# Patient Record
Sex: FEMALE | Race: BLACK OR AFRICAN AMERICAN | NOT HISPANIC OR LATINO | Employment: UNEMPLOYED | URBAN - METROPOLITAN AREA
[De-identification: names, ages, dates, MRNs, and addresses within clinical notes are randomized per-mention and may not be internally consistent; named-entity substitution may affect disease eponyms.]

---

## 2014-07-16 LAB
LEFT EYE DM RETINOPATHY: NEGATIVE
RIGHT EYE DM RETINOPATHY: NEGATIVE

## 2015-11-19 LAB
LEFT EYE DM RETINOPATHY: NEGATIVE
RIGHT EYE DM RETINOPATHY: NEGATIVE

## 2017-01-19 LAB
LEFT EYE DM RETINOPATHY: NEGATIVE
RIGHT EYE DM RETINOPATHY: NEGATIVE

## 2020-02-06 ENCOUNTER — HISTORICAL (OUTPATIENT)
Dept: LAB | Facility: HOSPITAL | Age: 35
End: 2020-02-06

## 2020-02-06 LAB
ABS NEUT (OLG): 3.54 X10(3)/MCL (ref 2.1–9.2)
ALBUMIN SERPL-MCNC: 3.9 GM/DL (ref 3.4–5)
ALBUMIN/GLOB SERPL: 1 RATIO (ref 1.1–2)
ALP SERPL-CCNC: 96 UNIT/L (ref 38–126)
ALT SERPL-CCNC: 66 UNIT/L (ref 12–78)
AST SERPL-CCNC: 59 UNIT/L (ref 15–37)
BASOPHILS # BLD AUTO: 0 X10(3)/MCL (ref 0–0.2)
BASOPHILS NFR BLD AUTO: 0 %
BILIRUB SERPL-MCNC: 0.8 MG/DL (ref 0.2–1)
BILIRUBIN DIRECT+TOT PNL SERPL-MCNC: 0.2 MG/DL (ref 0–0.5)
BILIRUBIN DIRECT+TOT PNL SERPL-MCNC: 0.6 MG/DL (ref 0–0.8)
BUN SERPL-MCNC: 9 MG/DL (ref 7–18)
CALCIUM SERPL-MCNC: 8.9 MG/DL (ref 8.5–10.1)
CHLORIDE SERPL-SCNC: 101 MMOL/L (ref 98–107)
CHOLEST SERPL-MCNC: 197 MG/DL (ref 0–200)
CHOLEST/HDLC SERPL: 5.6 {RATIO} (ref 0–4)
CO2 SERPL-SCNC: 20 MMOL/L (ref 21–32)
CREAT SERPL-MCNC: 0.84 MG/DL (ref 0.55–1.02)
DEPRECATED CALCIDIOL+CALCIFEROL SERPL-MC: 17.49 NG/ML (ref 30–80)
EOSINOPHIL # BLD AUTO: 0.1 X10(3)/MCL (ref 0–0.9)
EOSINOPHIL NFR BLD AUTO: 1 %
ERYTHROCYTE [DISTWIDTH] IN BLOOD BY AUTOMATED COUNT: 12.4 % (ref 11.5–17)
EST. AVERAGE GLUCOSE BLD GHB EST-MCNC: 280 MG/DL
GLOBULIN SER-MCNC: 4.1 GM/DL (ref 2.4–3.5)
GLUCOSE SERPL-MCNC: 357 MG/DL (ref 74–106)
HBA1C MFR BLD: 11.4 % (ref 4.2–6.3)
HCT VFR BLD AUTO: 41.7 % (ref 37–47)
HDLC SERPL-MCNC: 35 MG/DL (ref 35–60)
HGB BLD-MCNC: 13.4 GM/DL (ref 12–16)
LDLC SERPL CALC-MCNC: 103 MG/DL (ref 0–129)
LYMPHOCYTES # BLD AUTO: 4.8 X10(3)/MCL (ref 0.6–4.6)
LYMPHOCYTES NFR BLD AUTO: 53 %
MCH RBC QN AUTO: 30.4 PG (ref 27–31)
MCHC RBC AUTO-ENTMCNC: 32.1 GM/DL (ref 33–36)
MCV RBC AUTO: 94.6 FL (ref 80–94)
MONOCYTES # BLD AUTO: 0.6 X10(3)/MCL (ref 0.1–1.3)
MONOCYTES NFR BLD AUTO: 6 %
NEUTROPHILS # BLD AUTO: 3.54 X10(3)/MCL (ref 2.1–9.2)
NEUTROPHILS NFR BLD AUTO: 39 %
PLATELET # BLD AUTO: 374 X10(3)/MCL (ref 130–400)
PMV BLD AUTO: 10.1 FL (ref 9.4–12.4)
POTASSIUM SERPL-SCNC: 3.9 MMOL/L (ref 3.5–5.1)
PROT SERPL-MCNC: 8 GM/DL (ref 6.4–8.2)
RBC # BLD AUTO: 4.41 X10(6)/MCL (ref 4.2–5.4)
SODIUM SERPL-SCNC: 134 MMOL/L (ref 136–145)
T4 FREE SERPL-MCNC: 1.53 NG/DL (ref 0.76–1.46)
TRIGL SERPL-MCNC: 294 MG/DL (ref 30–150)
TSH SERPL-ACNC: 5.14 MIU/L (ref 0.36–3.74)
VLDLC SERPL CALC-MCNC: 59 MG/DL
WBC # SPEC AUTO: 9.1 X10(3)/MCL (ref 4.5–11.5)

## 2020-02-26 LAB
LEFT EYE DM RETINOPATHY: NEGATIVE
RIGHT EYE DM RETINOPATHY: NEGATIVE

## 2020-03-05 ENCOUNTER — HISTORICAL (OUTPATIENT)
Dept: LAB | Facility: HOSPITAL | Age: 35
End: 2020-03-05

## 2020-03-05 LAB
CREAT UR-MCNC: 135 MG/DL
MICROALBUMIN UR-MCNC: 3.1 MG/DL
MICROALBUMIN/CREAT RATIO PNL UR: 23 MG/GM CR (ref 0–30)

## 2020-07-13 ENCOUNTER — HISTORICAL (OUTPATIENT)
Dept: LAB | Facility: HOSPITAL | Age: 35
End: 2020-07-13

## 2020-07-13 LAB
BUN SERPL-MCNC: 8.8 MG/DL (ref 7–18.7)
CALCIUM SERPL-MCNC: 8.9 MG/DL (ref 8.4–10.2)
CHLORIDE SERPL-SCNC: 99 MMOL/L (ref 98–107)
CO2 SERPL-SCNC: 26 MMOL/L (ref 22–29)
CREAT SERPL-MCNC: 0.78 MG/DL (ref 0.57–1.11)
CREAT/UREA NIT SERPL: 11
DEPRECATED CALCIDIOL+CALCIFEROL SERPL-MC: 36.2 NG/ML (ref 6.6–49.9)
EST. AVERAGE GLUCOSE BLD GHB EST-MCNC: 228.8 MG/DL
GLUCOSE SERPL-MCNC: 302 MG/DL (ref 74–100)
HBA1C MFR BLD: 9.6 %
POTASSIUM SERPL-SCNC: 4.1 MMOL/L (ref 3.5–5.1)
SODIUM SERPL-SCNC: 136 MMOL/L (ref 136–145)
T3FREE SERPL-MCNC: 3.03 PG/ML (ref 1.71–3.71)
T4 FREE SERPL-MCNC: 0.97 NG/DL (ref 0.7–1.48)
TSH SERPL-ACNC: 4.17 UIU/ML (ref 0.35–4.94)

## 2020-10-27 ENCOUNTER — HISTORICAL (OUTPATIENT)
Dept: ADMINISTRATIVE | Facility: HOSPITAL | Age: 35
End: 2020-10-27

## 2020-10-27 LAB
BUN SERPL-MCNC: 10.5 MG/DL (ref 7–18.7)
CALCIUM SERPL-MCNC: 8.5 MG/DL (ref 8.4–10.2)
CHLORIDE SERPL-SCNC: 105 MMOL/L (ref 98–107)
CHOLEST SERPL-MCNC: 118 MG/DL
CHOLEST/HDLC SERPL: 3 {RATIO} (ref 0–5)
CO2 SERPL-SCNC: 20 MMOL/L (ref 22–29)
CREAT SERPL-MCNC: 0.78 MG/DL (ref 0.55–1.02)
CREAT/UREA NIT SERPL: 13
EST. AVERAGE GLUCOSE BLD GHB EST-MCNC: 177.2 MG/DL
GLUCOSE SERPL-MCNC: 104 MG/DL (ref 74–100)
HBA1C MFR BLD: 7.8 %
HDLC SERPL-MCNC: 35 MG/DL (ref 35–60)
LDLC SERPL CALC-MCNC: 60 MG/DL (ref 50–140)
POTASSIUM SERPL-SCNC: 4.1 MMOL/L (ref 3.5–5.1)
SODIUM SERPL-SCNC: 140 MMOL/L (ref 136–145)
TRIGL SERPL-MCNC: 117 MG/DL (ref 37–140)
VLDLC SERPL CALC-MCNC: 23 MG/DL

## 2021-06-07 LAB
LEFT EYE DM RETINOPATHY: NEGATIVE
LEFT EYE DM RETINOPATHY: NEGATIVE
RIGHT EYE DM RETINOPATHY: NEGATIVE
RIGHT EYE DM RETINOPATHY: NEGATIVE

## 2021-07-15 ENCOUNTER — HISTORICAL (OUTPATIENT)
Dept: RADIOLOGY | Facility: HOSPITAL | Age: 36
End: 2021-07-15

## 2021-07-15 LAB
ALBUMIN SERPL-MCNC: 3.9 GM/DL (ref 3.5–5)
ALBUMIN/GLOB SERPL: 0.9 RATIO (ref 1.1–2)
ALP SERPL-CCNC: 86 UNIT/L (ref 40–150)
ALT SERPL-CCNC: 39 UNIT/L (ref 0–55)
AST SERPL-CCNC: 32 UNIT/L (ref 5–34)
BILIRUB SERPL-MCNC: 0.9 MG/DL (ref 0.2–1.2)
BILIRUBIN DIRECT+TOT PNL SERPL-MCNC: 0.3 MG/DL (ref 0–0.5)
BILIRUBIN DIRECT+TOT PNL SERPL-MCNC: 0.6 MG/DL (ref 0–0.8)
BUN SERPL-MCNC: 8.7 MG/DL (ref 7–18.7)
CALCIUM SERPL-MCNC: 9.9 MG/DL (ref 8.4–10.2)
CHLORIDE SERPL-SCNC: 100 MMOL/L (ref 98–107)
CO2 SERPL-SCNC: 24 MMOL/L (ref 22–29)
CREAT SERPL-MCNC: 0.89 MG/DL (ref 0.57–1.11)
CREAT UR-MCNC: 268.2 MG/DL (ref 45–106)
DEPRECATED CALCIDIOL+CALCIFEROL SERPL-MC: 58.6 NG/ML (ref 30–80)
EST. AVERAGE GLUCOSE BLD GHB EST-MCNC: 292 MG/DL
GLOBULIN SER-MCNC: 4.4 GM/DL (ref 2.4–3.5)
GLUCOSE SERPL-MCNC: 342 MG/DL (ref 74–100)
HBA1C MFR BLD: 11.8 %
MICROALBUMIN UR-MCNC: 123.2 UG/ML
MICROALBUMIN/CREAT RATIO PNL UR: 45.9 MG/GM CR (ref 0–30)
POTASSIUM SERPL-SCNC: 3.4 MMOL/L (ref 3.5–5.1)
PROT SERPL-MCNC: 8.3 GM/DL (ref 6.4–8.3)
SODIUM SERPL-SCNC: 137 MMOL/L (ref 136–145)
TSH SERPL-ACNC: 3.59 UIU/ML (ref 0.35–4.94)

## 2021-10-21 ENCOUNTER — HISTORICAL (OUTPATIENT)
Dept: LAB | Facility: HOSPITAL | Age: 36
End: 2021-10-21

## 2021-10-21 LAB
BUN SERPL-MCNC: 15.2 MG/DL (ref 7–18.7)
CALCIUM SERPL-MCNC: 9.2 MG/DL (ref 8.4–10.2)
CHLORIDE SERPL-SCNC: 100 MMOL/L (ref 98–107)
CO2 SERPL-SCNC: 24 MMOL/L (ref 22–29)
CREAT SERPL-MCNC: 0.83 MG/DL (ref 0.57–1.11)
CREAT/UREA NIT SERPL: 18
EST. AVERAGE GLUCOSE BLD GHB EST-MCNC: 246 MG/DL
GLUCOSE SERPL-MCNC: 381 MG/DL (ref 74–100)
HBA1C MFR BLD: 10.2 %
POTASSIUM SERPL-SCNC: 3.8 MMOL/L (ref 3.5–5.1)
SODIUM SERPL-SCNC: 135 MMOL/L (ref 136–145)

## 2022-01-26 ENCOUNTER — HISTORICAL (OUTPATIENT)
Dept: LAB | Facility: HOSPITAL | Age: 37
End: 2022-01-26

## 2022-01-26 LAB
EST. AVERAGE GLUCOSE BLD GHB EST-MCNC: 165.7 MG/DL
HBA1C MFR BLD: 7.4 %

## 2022-04-10 ENCOUNTER — HISTORICAL (OUTPATIENT)
Dept: ADMINISTRATIVE | Facility: HOSPITAL | Age: 37
End: 2022-04-10
Payer: MEDICARE

## 2022-04-26 VITALS
OXYGEN SATURATION: 99 % | HEIGHT: 63 IN | DIASTOLIC BLOOD PRESSURE: 62 MMHG | BODY MASS INDEX: 51.91 KG/M2 | WEIGHT: 293 LBS | SYSTOLIC BLOOD PRESSURE: 110 MMHG

## 2022-05-09 RX ORDER — GLIMEPIRIDE 4 MG/1
4 TABLET ORAL DAILY
COMMUNITY
Start: 2021-07-28 | End: 2022-05-09 | Stop reason: SDUPTHER

## 2022-05-09 RX ORDER — GLIMEPIRIDE 4 MG/1
4 TABLET ORAL DAILY
Qty: 30 TABLET | Refills: 1 | Status: SHIPPED | OUTPATIENT
Start: 2022-05-09 | End: 2022-09-09 | Stop reason: SDUPTHER

## 2022-05-27 RX ORDER — INSULIN ASPART 100 [IU]/ML
33 INJECTION, SUSPENSION SUBCUTANEOUS
COMMUNITY
Start: 2021-07-12 | End: 2022-05-27 | Stop reason: SDUPTHER

## 2022-05-31 RX ORDER — INSULIN ASPART 100 [IU]/ML
33 INJECTION, SUSPENSION SUBCUTANEOUS
Qty: 19.8 ML | Refills: 2 | Status: SHIPPED | OUTPATIENT
Start: 2022-05-31 | End: 2022-11-02

## 2022-06-06 DIAGNOSIS — E11.65 TYPE 2 DIABETES MELLITUS WITH HYPERGLYCEMIA, WITH LONG-TERM CURRENT USE OF INSULIN: Primary | ICD-10-CM

## 2022-06-06 DIAGNOSIS — Z79.4 TYPE 2 DIABETES MELLITUS WITH HYPERGLYCEMIA, WITH LONG-TERM CURRENT USE OF INSULIN: Primary | ICD-10-CM

## 2022-07-05 ENCOUNTER — LAB VISIT (OUTPATIENT)
Dept: LAB | Facility: HOSPITAL | Age: 37
End: 2022-07-05
Attending: STUDENT IN AN ORGANIZED HEALTH CARE EDUCATION/TRAINING PROGRAM
Payer: MEDICARE

## 2022-07-05 ENCOUNTER — OFFICE VISIT (OUTPATIENT)
Dept: FAMILY MEDICINE | Facility: CLINIC | Age: 37
End: 2022-07-05
Payer: MEDICARE

## 2022-07-05 ENCOUNTER — CLINICAL SUPPORT (OUTPATIENT)
Dept: DIABETES | Facility: CLINIC | Age: 37
End: 2022-07-05
Payer: MEDICARE

## 2022-07-05 VITALS
RESPIRATION RATE: 20 BRPM | DIASTOLIC BLOOD PRESSURE: 76 MMHG | BODY MASS INDEX: 59.07 KG/M2 | HEART RATE: 85 BPM | HEIGHT: 59 IN | TEMPERATURE: 98 F | OXYGEN SATURATION: 99 % | WEIGHT: 293 LBS | SYSTOLIC BLOOD PRESSURE: 109 MMHG

## 2022-07-05 VITALS — BODY MASS INDEX: 59.07 KG/M2 | HEIGHT: 59 IN | WEIGHT: 293 LBS

## 2022-07-05 DIAGNOSIS — Z79.4 INSULIN DEPENDENT TYPE 2 DIABETES MELLITUS: Primary | ICD-10-CM

## 2022-07-05 DIAGNOSIS — D50.9 IRON DEFICIENCY ANEMIA, UNSPECIFIED IRON DEFICIENCY ANEMIA TYPE: ICD-10-CM

## 2022-07-05 DIAGNOSIS — Z79.4 TYPE 2 DIABETES MELLITUS TREATED WITH INSULIN: Primary | ICD-10-CM

## 2022-07-05 DIAGNOSIS — E78.2 MIXED HYPERLIPIDEMIA: ICD-10-CM

## 2022-07-05 DIAGNOSIS — E11.9 TYPE 2 DIABETES MELLITUS TREATED WITH INSULIN: Primary | ICD-10-CM

## 2022-07-05 DIAGNOSIS — E03.9 HYPOTHYROIDISM, UNSPECIFIED TYPE: ICD-10-CM

## 2022-07-05 DIAGNOSIS — G47.33 OBSTRUCTIVE SLEEP APNEA SYNDROME: ICD-10-CM

## 2022-07-05 DIAGNOSIS — E11.65 TYPE 2 DIABETES MELLITUS WITH HYPERGLYCEMIA, WITH LONG-TERM CURRENT USE OF INSULIN: ICD-10-CM

## 2022-07-05 DIAGNOSIS — E55.9 VITAMIN D DEFICIENCY: ICD-10-CM

## 2022-07-05 DIAGNOSIS — Z79.4 TYPE 2 DIABETES MELLITUS WITH HYPERGLYCEMIA, WITH LONG-TERM CURRENT USE OF INSULIN: ICD-10-CM

## 2022-07-05 DIAGNOSIS — I10 PRIMARY HYPERTENSION: ICD-10-CM

## 2022-07-05 DIAGNOSIS — J30.9 ALLERGIC RHINITIS, UNSPECIFIED SEASONALITY, UNSPECIFIED TRIGGER: ICD-10-CM

## 2022-07-05 DIAGNOSIS — F20.9 SCHIZOPHRENIA, UNSPECIFIED TYPE: ICD-10-CM

## 2022-07-05 DIAGNOSIS — F41.1 GENERALIZED ANXIETY DISORDER: ICD-10-CM

## 2022-07-05 DIAGNOSIS — G47.00 INSOMNIA, UNSPECIFIED TYPE: ICD-10-CM

## 2022-07-05 DIAGNOSIS — E11.9 INSULIN DEPENDENT TYPE 2 DIABETES MELLITUS: Primary | ICD-10-CM

## 2022-07-05 PROBLEM — E78.5 HYPERLIPIDEMIA: Status: ACTIVE | Noted: 2022-07-05

## 2022-07-05 LAB
EST. AVERAGE GLUCOSE BLD GHB EST-MCNC: 243.2 MG/DL
HBA1C MFR BLD: 10.1 %

## 2022-07-05 PROCEDURE — 99214 OFFICE O/P EST MOD 30 MIN: CPT | Mod: ,,, | Performed by: STUDENT IN AN ORGANIZED HEALTH CARE EDUCATION/TRAINING PROGRAM

## 2022-07-05 PROCEDURE — 36415 COLL VENOUS BLD VENIPUNCTURE: CPT

## 2022-07-05 PROCEDURE — G0108 DIAB MANAGE TRN  PER INDIV: HCPCS | Mod: ,,, | Performed by: INTERNAL MEDICINE

## 2022-07-05 PROCEDURE — 99214 PR OFFICE/OUTPT VISIT, EST, LEVL IV, 30-39 MIN: ICD-10-PCS | Mod: ,,, | Performed by: STUDENT IN AN ORGANIZED HEALTH CARE EDUCATION/TRAINING PROGRAM

## 2022-07-05 PROCEDURE — G0108 PR DIAB MANAGE TRN  PER INDIV: ICD-10-PCS | Mod: ,,, | Performed by: INTERNAL MEDICINE

## 2022-07-05 PROCEDURE — 83036 HEMOGLOBIN GLYCOSYLATED A1C: CPT

## 2022-07-05 RX ORDER — INSULIN GLARGINE 100 [IU]/ML
16 INJECTION, SOLUTION SUBCUTANEOUS NIGHTLY
COMMUNITY
Start: 2022-05-04 | End: 2024-01-05 | Stop reason: SDUPTHER

## 2022-07-05 RX ORDER — BENZTROPINE MESYLATE 1 MG/1
1 TABLET ORAL 2 TIMES DAILY
COMMUNITY

## 2022-07-05 RX ORDER — METOPROLOL TARTRATE 50 MG/1
1 TABLET ORAL 2 TIMES DAILY
COMMUNITY
Start: 2021-07-28 | End: 2024-01-02 | Stop reason: SDUPTHER

## 2022-07-05 RX ORDER — LOSARTAN POTASSIUM 50 MG/1
1 TABLET ORAL DAILY
COMMUNITY
Start: 2021-07-28 | End: 2024-01-02 | Stop reason: SDUPTHER

## 2022-07-05 RX ORDER — VENLAFAXINE HYDROCHLORIDE 150 MG/1
1 CAPSULE, EXTENDED RELEASE ORAL DAILY
COMMUNITY
Start: 2022-03-12

## 2022-07-05 RX ORDER — PEN NEEDLE, DIABETIC 31 GX5/16"
NEEDLE, DISPOSABLE MISCELLANEOUS
COMMUNITY
Start: 2022-03-12 | End: 2022-08-15 | Stop reason: SDUPTHER

## 2022-07-05 RX ORDER — SITAGLIPTIN AND METFORMIN HYDROCHLORIDE 1000; 50 MG/1; MG/1
1 TABLET, FILM COATED, EXTENDED RELEASE ORAL DAILY
COMMUNITY
Start: 2022-03-12 | End: 2022-08-15 | Stop reason: SDUPTHER

## 2022-07-05 RX ORDER — ATORVASTATIN CALCIUM 10 MG/1
1 TABLET, FILM COATED ORAL DAILY
COMMUNITY
Start: 2022-04-29 | End: 2022-11-02

## 2022-07-05 RX ORDER — ARIPIPRAZOLE 30 MG/1
1 TABLET ORAL DAILY
COMMUNITY
Start: 2022-03-19

## 2022-07-05 NOTE — ASSESSMENT & PLAN NOTE
- Patient states she used to be on thyroid medication, but this was discontinued due to stable thyroid numbers.  - TSH negative from 07/15/21.

## 2022-07-05 NOTE — PROGRESS NOTES
Subjective:      Patient ID: Kimi Bell is a 36 y.o.  female. Patient is accompanied by her father, Mr. Murtaza Bell.      Chief Complaint: Chronic Medical Management    IDDMII:     Lab Results   Component Value Date    HGBA1C 10.1 (H) 07/05/2022     Patient states she checks her blood sugars BID. Patient following with [facility] for her eye exams and has an upcoming appointment in September. Patient taking Novolog 33U BID, Janumet XR nightly, Amaryl daily, and Lantus daily. She is following with Diabetes Education and has an appointment with them after this appointment.     HTN/HLD: /76. Patient taking Losartan, Metoprolol, and Lipitor.    Hypothyroidism: Patient states she used to be on thyroid medication, but this was discontinued due to stable thyroid numbers. TSH negative from 07/15/21.    Schizophrenia/Anxiety/Insomnia: Patient following with Psychiatry. Patient taking Abilify, Benztropine, and Effexor.    THEODORE: Patient following with Neurology. She states compliance with CPAP nightly.    Iron Deficiency Anemia: Patient states compliance with iron supplementation.    Vitamin D Deficiency: Vitamin D level negative from 07/15/21.    Allergic Rhinitis: Patient not taking anything consistently.    Preventative Health: Tdap given on 02/05/17.     Review of Systems   Constitutional: Negative for activity change, fatigue and fever.   Respiratory: Negative for apnea, cough and shortness of breath.    Cardiovascular: Negative for chest pain and leg swelling.   Gastrointestinal: Negative for abdominal pain, blood in stool, diarrhea, nausea and vomiting.   Genitourinary: Negative for dysuria and hematuria.   Musculoskeletal: Positive for arthralgias. Negative for back pain and myalgias.   Skin: Negative for rash and wound.   Allergic/Immunologic: Positive for environmental allergies.   Neurological: Negative for dizziness, weakness, numbness and headaches.   Psychiatric/Behavioral:  "Negative for behavioral problems.     Objective:   /76 (BP Location: Right arm)   Pulse 85   Temp 98.3 °F (36.8 °C)   Resp 20   Ht 4' 11" (1.499 m)   Wt (!) 139.2 kg (306 lb 12.8 oz)   LMP 07/05/2022   SpO2 99%   BMI 61.97 kg/m²     Physical Exam  Vitals and nursing note reviewed.   Constitutional:       General: She is not in acute distress.     Appearance: Normal appearance. She is not ill-appearing or toxic-appearing.   HENT:      Head: Normocephalic and atraumatic.      Mouth/Throat:      Mouth: Mucous membranes are moist.      Pharynx: Oropharynx is clear.   Cardiovascular:      Rate and Rhythm: Normal rate and regular rhythm.   Pulmonary:      Effort: Pulmonary effort is normal. No respiratory distress.      Breath sounds: Normal breath sounds.   Abdominal:      General: There is no distension.      Palpations: Abdomen is soft.      Tenderness: There is no abdominal tenderness.   Musculoskeletal:         General: Normal range of motion.      Right lower leg: No edema.      Left lower leg: No edema.   Skin:     General: Skin is warm and dry.      Findings: No lesion or rash.   Neurological:      General: No focal deficit present.      Mental Status: She is alert. Mental status is at baseline.   Psychiatric:         Mood and Affect: Mood normal.         Behavior: Behavior normal.         Thought Content: Thought content normal.         Judgment: Judgment normal.       Assessment:     1. Type 2 diabetes mellitus treated with insulin    2. Primary hypertension    3. Mixed hyperlipidemia    4. Hypothyroidism, unspecified type    5. Schizophrenia, unspecified type    6. Generalized anxiety disorder    7. Insomnia, unspecified type    8. Obstructive sleep apnea syndrome    9. Iron deficiency anemia, unspecified iron deficiency anemia type    10. Allergic rhinitis, unspecified seasonality, unspecified trigger    11. Vitamin D deficiency      Plan:     Problem List Items Addressed This Visit        " Psychiatric    Generalized anxiety disorder     - Refer to Schizophrenia plan.           Schizophrenia     - Patient following with Psychiatry.  - Abilify 30mg daily, Benztropine 1mg BID, and Effexor XR 140mg daily per Psychiatry.                ENT    Allergic rhinitis     - Patient recommended to try Flonase and anti-histamines OTC.              Cardiac/Vascular    Hyperlipidemia     - Continue Lipitor 10mg daily.           Hypertension     - /76, well-controlled.  - Continue Losartan 50mg daily and Metoprolol 50mg BID.              Oncology    Iron deficiency anemia     - Continue iron supplementation.                Endocrine    Hypothyroidism     - Patient states she used to be on thyroid medication, but this was discontinued due to stable thyroid numbers.  - TSH negative from 07/15/21.             Relevant Orders    TSH    T4, Free    Type 2 diabetes mellitus treated with insulin - Primary     - A1c 10.1, deteriorated.  - Increasing Lantus 6U nightly to 10U nightly.  - Increasing Novolog 33U BID to 35U BID.  - Continue Janumet XR 50mg-1000mg (2 tablets) nightly and Amaryl 4mg daily.  - Patient following with Diabetes Education.  - Patient following with [facility] for eye exams. She reports upcoming appointment in September.           Relevant Medications    LANTUS SOLOSTAR U-100 INSULIN glargine 100 units/mL SubQ pen    JANUMET XR 50-1,000 mg TM24    Vitamin D deficiency     - Vitamin D level negative from 07/15/21.             Relevant Orders    Vitamin D       Other    Insomnia     - Refer to Schizophrenia plan.           Obstructive sleep apnea syndrome     - Patient following with Neurology.  - Patient states compliance with CPAP nightly.

## 2022-07-05 NOTE — PROGRESS NOTES
Diabetes Care Specialist Progress Note  Author: Faustina Ram RN  Date: 7/5/2022    Program Intake  Reason for Diabetes Program Visit:: Intervention  Type of Intervention:: Individual  Individual: Education  Education: Nutrition and Meal Planning  Current diabetes risk level:: high  Permission to speak with others about care:: yes    Lab Results   Component Value Date    HGBA1C 10.1 (H) 07/05/2022       Clinical    Patient Health Rating  Compared to other people your age, how would you rate your health?: Fair    Problem Review  Reviewed Problem List with Patient: yes  Active comorbidities affecting diabetes self-care.: no  Reviewed health maintenance: yes    Clinical Assessment  Current Diabetes Treatment: Insulin, Oral Medication (Lantus 6units daily, Novolog 35units bid, glimepiride 4mgdaily, Janumet xr 50-1000mg 2 tabs QD)  Have you ever experienced hypoglycemia (low blood sugar)?: yes  In the last month, how often have you experienced low blood sugar?: other (see comments) (once had 89 and felt weak and shaky)  Are you able to tell when your blood sugar is low?: Yes  What symptoms do you experience?: Shaky  Have you ever been hospitalized because your blood sugar was too low?: no  How do you treat hypoglycemia (low blood sugar)?: 1/2 can soda/fruit juice  Have you ever experienced hyperglycemia (high blood sugar)?: yes  In the last month, how often have you experienced high blood sugar?: once a day  Are you able to tell when your blood sugar is high?: Yes  What are your symptoms?: fatigue    Medication Information  How do you obtain your medications?: Family picks up  How many days a week do you miss your medications?: Never  Do you use a pill box or medication chart to help you manage your medications?: Pill box  Do you sometimes have difficulty refilling your medications?: No  Medication adherence impacting ability to self-manage diabetes?: No    Labs  Do you have regular lab work to monitor your  medications?: Yes  Type of Regular Lab Work: A1c  Lab Compliance Barriers: No    Nutritional Status  Diet: Regular  Meal Plan 24 Hour Recall: Snack, Lunch, Dinner  Meal Plan 24 Hour Recall - Breakfast: skips, sleeps late  Meal Plan 24 Hour Recall - Lunch: turkey wing rice  Meal Plan 24 Hour Recall - Dinner: fried fish and french fries  Meal Plan 24 Hour Recall - Snack: bag of chocolate covered donuts  Change in appetite?: No  Dentation:: Intact  Recent Changes in Weight: No Recent Weight Change  Current nutritional status an area of need that is impacting patient's ability to self-manage diabetes?: Yes    Additional Social History    Support  Does anyone support you with your diabetes care?: yes  Who supports you?: parent, self (Father)  Who takes you to your medical appointments?: parent (Father)  Does the current support meet the patient's needs?: Yes  Is Support an area impacting ability to self-manage diabetes?: No    Access to Mass Media & Technology  Does the patient have access to any of the following devices or technologies?: Smart phone, Internet Access  Media or technology needs impacting ability to self-manage diabetes?: No    Cognitive/Behavioral Health  Alert and Oriented: Yes  Difficulty Thinking: No  Requires Prompting: No  Requires assistance for routine expression?: No  Cognitive or behavioral barriers impacting ability to self-manage diabetes?: No    Culture/Restoration  Culture or Advent beliefs that may impact ability to access healthcare: No    Communication  Language preference: English  Hearing Problems: No  Vision Problems: No  Communication needs impacting ability to self-manage diabetes?: No    Health Literacy  Preferred Learning Method: Face to Face  How often do you need to have someone help you read instructions, pamphlets, or written material from your doctor or pharmacy?: Rarely  Health literacy needs impacting ability to self-manage diabetes?: No      Diabetes Self-Management Skills  Assessment    Diabetes Disease Process/Treatment Options  Patient/caregiver able to state what happens when someone has diabetes.: yes  Patient/caregiver knows what type of diabetes they have.: yes  Diabetes Type : Type II  Patient/caregiver able to identify at least three signs and symptoms of diabetes.: yes  Identified signs and symptoms:: fatigue, increased thirst  Patient able to identify at least three risk factors for diabetes.: yes  Identified risk factors:: being overweight, family history  Diabetes Disease Process/Treatment Options: Skills Assessment Completed: Yes  Assessment indicates:: Adequate understanding  Deferred due to:: Other (comment)  Area of need?: No    Nutrition/Healthy Eating  Challenges to healthy eating:: portion control, lack of will power  Method of carbohydrate measurement:: no method  Patient can identify foods that impact blood sugar.: yes  Patient-identified foods:: sweets, starches (bread, pasta, rice, cereal)  Nutrition/Healthy Eating Skills Assessment Completed:: Yes  Assessment indicates:: Instruction Needed  Area of need?: Yes    Physical Activity/Exercise  Patient's daily activity level:: sedentary  Patient formally exercises outside of work.: no  Reasons for not exercising:: other (see comments) (Motivation and limited mobility)  Patient can identify forms of physical activity.: yes  Stated forms of physical activity:: housework, seated/chair exercises  Patient can identify reasons why exercise/physical activity is important in diabetes management.: yes  Identified reasons:: lowers blood glucose, blood pressure, and cholesterol  Physical Activity/Exercise Skills Assessment Completed: : Yes  Assessment indicates:: Adequate understanding  Area of need?: Yes    Medications  Patient is able to describe current diabetes management routine.: yes  Diabetes management routine:: oral medications, insulin  Patient is able to identify current diabetes medications, dosages, and  appropriate timing of medications.: yes  Patient understands the purpose of the medications taken for diabetes.: yes  Patient reports problems or concerns with current medication regimen.: no  Medication Skills Assessment Completed:: Yes  Assessment indicates:: Adequate understanding  Area of need?: No    Home Blood Glucose Monitoring  Patient states that blood sugar is checked at home daily.: yes  Monitoring Method:: home glucometer  Home glucometer meter type:: One Touch Verio  How often do you check your blood sugar?: Twice a day  When do you check your blood sugar?: Before lunch, Before dinner  When you check what is your typical blood sugar range? : see log  Blood glucose logs:: yes, encouraged to bring logs to provider visits, encouraged to keep logs  Blood glucose logs reviewed today?: yes  Home Blood Glucose Monitoring Skills Assessment Completed: : Yes  Assessment indicates:: Adequate understanding  Area of need?: No                 Acute Complications  Patient is able to identify types of acute complications: Yes  Patient Identified:: Hypoglycemia  Patient is able to state the basic meaning of hypoglycemia?: Yes  Able to state the blood sugar range for hypoglycemia?: yes  Patient stated range:: 70  Patient can identify general symptoms of hypoglycemia: yes  Patient identified:: shakiness  Able to state proper treatment of hypoglycemia?: yes  Patient identified:: 1/2 can soda/fruit juice  Acute Complications Skills Assessment Completed: : Yes  Assessment indicates:: Adequate understanding  Area of need?: No    Chronic Complications  Patient can identify major chronic complications of diabetes.: yes  Stated chronic complications:: kidney disease  Patient can identify ways to prevent or delay diabetes complications.: yes  Stated ways to prevent complications:: controlling blood sugar  Patient is taking statin?: Yes  Chronic Complications Skills Assessment Completed: : Yes  Assessment indicates:: Adequate  understanding  Area of need?: No    Psychosocial/Coping  Patient can identify ways of coping with chronic disease.: yes  Patient-stated ways of coping with chronic disease:: support from loved ones  Psychosocial/Coping Skills Assessment Completed: : Yes  Assessment indicates:: Adequate understanding  Area of need?: No      Diabetes Self Support Plan         Assessment Summary and Plan    Based on today's diabetes care assessment, the following areas of need were identified:      Social 7/5/2022   Support No   Access to Mass Media/Tech No   Cognitive/Behavioral Health No   Culture/Religious No   Communication No   Health Literacy No        Clinical 7/5/2022   Medication Adherence No   Lab Compliance No   Nutritional Status Yes        Diabetes Self-Management Skills 7/5/2022   Diabetes Disease Process/Treatment Options No   Nutrition/Healthy Eating Yes   Physical Activity/Exercise Yes   Medication No   Home Blood Glucose Monitoring No   Acute Complications No   Chronic Complications No   Psychosocial/Coping No          Today's interventions were provided through individual discussion, instruction, and written materials were provided.      Patient verbalized understanding of instruction and written materials.  Pt was able to return back demonstration of instructions today. Patient understood key points, needs reinforcement and further instruction.     Diabetes Self-Management Care Plan:    Today's Diabetes Self-Management Care Plan was developed with Kimi's input. Kimi has agreed to work toward the following goal(s) to improve his/her overall diabetes control.      Care Plan: Diabetes Management   Updates made since 6/5/2022 12:00 AM      Problem: Physical Activity and Exercise       Long-Range Goal: Patient agrees to increase physical activity to a goal of 7 times per week for 6 minutes.    Start Date: 7/5/2022   Barriers: Physical Limitations      Task: Discussed role of physical activity on reducing insulin  resistance and improvement in overall glycemic control. Completed 7/5/2022      Task: Discussed role of physical activity as it relates to weight loss Completed 7/5/2022      Task: Offered suggestions on how patient could increase their regular physical activity Completed 7/5/2022          Follow Up Plan     Follow up in about 3 months (around 10/5/2022) for nutrition . Here with father for support and he drives her to [place]. She has glucose log which we reviewed and she also showed pcp today at [facility]. Novolin was increased to 35units bid. States takes medication routinely. She admits to falling back into old eating habits with increased carbs. She was eating lots of donuts, burgers and fries, and fried fish.  She will decrease portion sizes and add non-starchy vegetables back into diet. She has not been exercising, having lots of fatigue. Discussed importance of daily physical activity with review of benefits, methods, guidelines and precautions. Reviewed glucose and A1C goals and long term complications of high A1C levels.   Physical Activity Suggestions like bike for 3min and 3lb arm weights for 3min daily.  Goal made today. Plan f/u in 3 months for nutrition review, glucose log and goals.    Today's care plan and follow up schedule was discussed with patient.  Kimi verbalized understanding of the care plan, goals, and agrees to follow up plan.        The patient was encouraged to communicate with his/her health care provider/physician and care team regarding his/her condition(s) and treatment.  I provided the patient with my contact information today and encouraged to contact me via phone or [facility] Patient Portal as needed.     Length of Visit   Total Time: 30 Minutes

## 2022-07-05 NOTE — ASSESSMENT & PLAN NOTE
- A1c 10.1, deteriorated.  - Increasing Lantus 6U nightly to 10U nightly.  - Increasing Novolog 33U BID to 35U BID.  - Continue Janumet XR 50mg-1000mg (2 tablets) nightly and Amaryl 4mg daily.  - Patient following with Diabetes Education.  - Patient following with [facility] for eye exams. She reports upcoming appointment in September.

## 2022-07-05 NOTE — ASSESSMENT & PLAN NOTE
- Patient following with Psychiatry.  - Abilify 30mg daily, Benztropine 1mg BID, and Effexor XR 140mg daily per Psychiatry.

## 2022-07-06 ENCOUNTER — TELEPHONE (OUTPATIENT)
Dept: FAMILY MEDICINE | Facility: CLINIC | Age: 37
End: 2022-07-06
Payer: MEDICARE

## 2022-07-06 NOTE — TELEPHONE ENCOUNTER
----- Message from Karmen Kumar DO sent at 7/5/2022 12:41 PM CDT -----  A1c 10.1, deteriorated. Continue Amaryl 4mg daily. Patient instructed to increase Novolog to 35U BID. Continue Janumet 50mg-1000mg daily. Increase Lantus from 6U to 10U daily. Continue to monitor blood sugars. Patient to contact the clinic in 1 week with blood sugar readings. Repeat A1c in 3 months for routine monitoring.

## 2022-07-15 ENCOUNTER — TELEPHONE (OUTPATIENT)
Dept: FAMILY MEDICINE | Facility: CLINIC | Age: 37
End: 2022-07-15
Payer: MEDICARE

## 2022-07-15 NOTE — TELEPHONE ENCOUNTER
----- Message from Lor Scotty sent at 7/15/2022 11:33 AM CDT -----  Regarding: Sugar Readings  Type:  Needs Medical Advice    Who Called:  pt  Symptoms (please be specific):  sugar readings   How long has patient had these symptoms:   na  Pharmacy name and phone #:  na  Would the patient rather a call back or a response via [facility]? y  Best Call Back Number:  [phone]  Additional Information: Sugar Readings     7/10 - 435  7/11 - 340  7/12 - 301  7/13 - 268  7/14 - 352  7/15 - 296

## 2022-07-15 NOTE — TELEPHONE ENCOUNTER
See daily blood sugar readings. Pts Lantus was increased to 10 units daily and the Novolog was increased to 35 units twice a day on 07/05/2022. Please advise

## 2022-07-18 NOTE — TELEPHONE ENCOUNTER
Pt notified of the increase in the Lantus. Verbal understanding given. She will call in a week with the blood sugar readings

## 2022-07-25 ENCOUNTER — TELEPHONE (OUTPATIENT)
Dept: FAMILY MEDICINE | Facility: CLINIC | Age: 37
End: 2022-07-25
Payer: MEDICARE

## 2022-07-25 NOTE — TELEPHONE ENCOUNTER
----- Message from Jaci Pablo sent at 7/22/2022 12:01 PM CDT -----  Regarding: Blood Glucose Readings  Type:  Needs Medical Advice    Who Called: Kimi  Symptoms (please be specific):   How long has patient had these symptoms:    Pharmacy name and phone #:    Would the patient rather a call back or a response via [facility]?   Best Call Back Number: [phone]  Additional Information: patient gave these readings, all after eating and taking insulin   July 18: 285  July 19: 266  July 20: 229  July 21: 252  July 22: 205

## 2022-07-26 NOTE — TELEPHONE ENCOUNTER
How much Lantus and Novolog is she administering daily?    What are her morning fasting blood sugars?

## 2022-07-26 NOTE — TELEPHONE ENCOUNTER
Continue current regimen. Please have patient report fasting blood sugars and also post-prandial sugars (2 hours after eating) next week.

## 2022-07-26 NOTE — TELEPHONE ENCOUNTER
Pt has been not been taking her blood sugars fasting. I explained to pts father that she needs to check her blood sugars when she first wakes up before eating and before taking any insulin. Verbal Understanding given. He will have pt to call us in 1 week wit her readings.    Pt is currently on Lantus 15 units daily and Novolog 35 units twice a day. Please advise of any further orders.

## 2022-08-05 ENCOUNTER — TELEPHONE (OUTPATIENT)
Dept: FAMILY MEDICINE | Facility: CLINIC | Age: 37
End: 2022-08-05
Payer: MEDICARE

## 2022-08-05 NOTE — TELEPHONE ENCOUNTER
----- Message from Daphne Jo sent at 8/5/2022 10:57 AM CDT -----  Regarding: blood sugar log for the week of 8/1  Type:  Needs Medical Advice    Who Called: pt  Would the patient rather a call back or a response via [facility]? C/b  Best Call Back Number: [phone]  Additional Information: pt called to give blood sugar #'s before meals  8/1 Monday- 296  8/2 Tuesday- 225  8/3 Wednesday- 161  8/4 Thursday -178  8/5 Friday- 141

## 2022-08-05 NOTE — TELEPHONE ENCOUNTER
Continue current insulin regimen. Please contact the clinic in 1 week for fasting blood sugar readings again.

## 2022-08-11 ENCOUNTER — TELEPHONE (OUTPATIENT)
Dept: FAMILY MEDICINE | Facility: CLINIC | Age: 37
End: 2022-08-11
Payer: MEDICARE

## 2022-08-11 DIAGNOSIS — E11.9 TYPE 2 DIABETES MELLITUS TREATED WITH INSULIN: Primary | ICD-10-CM

## 2022-08-11 DIAGNOSIS — Z79.4 TYPE 2 DIABETES MELLITUS TREATED WITH INSULIN: Primary | ICD-10-CM

## 2022-08-11 RX ORDER — PEN NEEDLE, DIABETIC 30 GX3/16"
1 NEEDLE, DISPOSABLE MISCELLANEOUS 2 TIMES DAILY
COMMUNITY
End: 2022-08-11 | Stop reason: SDUPTHER

## 2022-08-11 RX ORDER — PEN NEEDLE, DIABETIC 30 GX3/16"
1 NEEDLE, DISPOSABLE MISCELLANEOUS 2 TIMES DAILY
Qty: 50 EACH | Refills: 11 | OUTPATIENT
Start: 2022-08-11 | End: 2022-09-10

## 2022-08-11 NOTE — TELEPHONE ENCOUNTER
LOV: 7.5.22  NOV: 10.6.22  Spoke with patient and she uses the pen needles bid with novolog and lantus. Rx sent to pharmacy. Carlos

## 2022-08-11 NOTE — TELEPHONE ENCOUNTER
----- Message from Daphne Sandro sent at 8/11/2022  1:46 PM CDT -----  Regarding: diabetic supply  Type:  Diabetic/Medical Supplies Request    Name of Caller:pt  What supplies are needed:ultra fine pen needles  What is the brand of the supplies:BD ultra fine  Refill or New Rx:refill  If checking glucose, how many times do they check it?:2 x day  Who prescribed the original supplies:carly rosa  Pharmacy/Company Name, Phone #, Location:[facility]  Requesting a Call Back?:yes  Would the patient rather a call back or a response via [facility]? C/b  Best Call Back Number:[phone]  Additional Information:

## 2022-08-12 ENCOUNTER — TELEPHONE (OUTPATIENT)
Dept: FAMILY MEDICINE | Facility: CLINIC | Age: 37
End: 2022-08-12
Payer: MEDICARE

## 2022-08-12 NOTE — TELEPHONE ENCOUNTER
Have patient increase Lantus by 2U daily and contact the clinic in 1 week with blood sugar readings.

## 2022-08-12 NOTE — TELEPHONE ENCOUNTER
----- Message from Daphne Jo sent at 8/12/2022 10:13 AM CDT -----  Regarding: blood sugar log  Type:  sugar log     Who Called: pt  Would the patient rather a call back or a response via [facility]? C/b  Best Call Back Number: [phone]  Additional Information: pt sugar log         8/8      158  8/9      190  8/10    183  8/11    164  8/12    145

## 2022-08-15 DIAGNOSIS — Z79.4 TYPE 2 DIABETES MELLITUS TREATED WITH INSULIN: ICD-10-CM

## 2022-08-15 DIAGNOSIS — E11.9 TYPE 2 DIABETES MELLITUS TREATED WITH INSULIN: ICD-10-CM

## 2022-08-15 RX ORDER — PEN NEEDLE, DIABETIC 31 GX5/16"
NEEDLE, DISPOSABLE MISCELLANEOUS
Qty: 100 EACH | Refills: 3 | Status: SHIPPED | OUTPATIENT
Start: 2022-08-15 | End: 2022-11-01 | Stop reason: SDUPTHER

## 2022-08-15 RX ORDER — SITAGLIPTIN AND METFORMIN HYDROCHLORIDE 1000; 50 MG/1; MG/1
1 TABLET, FILM COATED, EXTENDED RELEASE ORAL DAILY
Qty: 90 TABLET | Refills: 0 | Status: SHIPPED | OUTPATIENT
Start: 2022-08-15 | End: 2022-11-01 | Stop reason: SDUPTHER

## 2022-08-15 NOTE — TELEPHONE ENCOUNTER
----- Message from Daphne Jo sent at 8/15/2022 10:45 AM CDT -----  Regarding: diabetic supplies  & refill  Type:  Diabetic/Medical Supplies Request    Name of Caller:pt  What supplies are needed: ultra fine pen needles  What is the brand of the supplies:BD  Refill or New Rx:refill  If checking glucose, how many times do they check it?: 1-2 x day  Who prescribed the original supplies:carly rosa  Pharmacy/Company Name, Phone #, Location:[facility]   Requesting a Call Back?:  Would the patient rather a call back or a response via [facility]? C/b  Best Call Back Number:[phone]  Additional Information:      Type:  RX Refill Request    Who Called: pt  Refill or New Rx:refill  RX Name and Strength:JANUMET XR 50-1,000 mg TM24  How is the patient currently taking it? (ex. 1XDay): 2x day  Is this a 30 day or 90 day RX:30  Preferred Pharmacy with phone number:[facility]  Local or Mail Order:local  Ordering Provider:carly rosa  Would the patient rather a call back or a response via [facility]? C/b  Best Call Back Number:[phone]  Additional Information:

## 2022-08-19 ENCOUNTER — TELEPHONE (OUTPATIENT)
Dept: FAMILY MEDICINE | Facility: CLINIC | Age: 37
End: 2022-08-19
Payer: MEDICARE

## 2022-08-19 NOTE — TELEPHONE ENCOUNTER
----- Message from Tila Sheriff sent at 8/19/2022 10:50 AM CDT -----  Regarding: F/U  .Type:  Needs Medical Advice    Who Called: Pt  Symptoms (please be specific):    How long has patient had these symptoms:    Pharmacy name and phone #:    Would the patient rather a call back or a response via [facility]? Call back  Best Call Back Number: [phone]  Additional Information: Pt wants to leave diabetic numbers..  8/15: 131  8/16: 156  8/17: 143  8/18: 182  8/19: 148

## 2022-09-02 ENCOUNTER — TELEPHONE (OUTPATIENT)
Dept: FAMILY MEDICINE | Facility: CLINIC | Age: 37
End: 2022-09-02
Payer: MEDICARE

## 2022-09-02 NOTE — TELEPHONE ENCOUNTER
----- Message from Patricio Tapia sent at 9/2/2022  8:17 AM CDT -----  .Type:  Needs Medical Advice    Who Called: pt  Would the patient rather a call back or a response via [facility]? Call back  Best Call Back Number: [phone]  Additional Information: calling to report her diabetic numbers Monday 08/29 110, Tuesday 08/30 132, Wednesday 08/31 145, Thursday 09/01 157, Friday 09/02 163

## 2022-09-02 NOTE — TELEPHONE ENCOUNTER
Please confirm how much insulin patient is administering daily. Would recommend she continue her current diabetic medication regimen and continue to monitor her blood sugars and contact the clinic in 1 week with readings.

## 2022-09-07 NOTE — TELEPHONE ENCOUNTER
"Spoke to pt and notified her of the recommendations. Verbal understanding was given. Pt states that she currently takes "about" 35 units of Novolog and "about" 8 units of Lantus. I asked her again for the the exact amount of insulin that she takes and she told me the same thing. Please advise on any further recommendations.  "

## 2022-09-09 RX ORDER — GLIMEPIRIDE 4 MG/1
4 TABLET ORAL DAILY
Qty: 30 TABLET | Refills: 0 | Status: SHIPPED | OUTPATIENT
Start: 2022-09-09 | End: 2022-09-13 | Stop reason: SDUPTHER

## 2022-09-12 NOTE — TELEPHONE ENCOUNTER
Please have patient increase Lantus to 9U nightly. Continue Novolog 35U BID as she is currently doing. Continue to monitor sugars and contact the clinic in 1 week with glucose readings.

## 2022-09-13 RX ORDER — GLIMEPIRIDE 4 MG/1
4 TABLET ORAL DAILY
Qty: 90 TABLET | Refills: 0 | Status: SHIPPED | OUTPATIENT
Start: 2022-09-13 | End: 2022-11-01 | Stop reason: SDUPTHER

## 2022-09-16 ENCOUNTER — TELEPHONE (OUTPATIENT)
Dept: FAMILY MEDICINE | Facility: CLINIC | Age: 37
End: 2022-09-16
Payer: MEDICARE

## 2022-09-16 NOTE — TELEPHONE ENCOUNTER
Recommend patient increase Lantus to 9U and contact the clinic in 1 week with blood sugar readings.

## 2022-09-16 NOTE — TELEPHONE ENCOUNTER
----- Message from Patricio Tapia sent at 9/16/2022 11:11 AM CDT -----  Patient is calling to report her diabetic numbers.   Monday 09/12 161  Tuesday 09/13 134  Wednesday 09/14 148  Thursday 09/15 177  Friday 09/16 108

## 2022-10-19 ENCOUNTER — PATIENT OUTREACH (OUTPATIENT)
Dept: ADMINISTRATIVE | Facility: HOSPITAL | Age: 37
End: 2022-10-19
Payer: MEDICARE

## 2022-10-19 NOTE — PROGRESS NOTES
[facility] CMS chart audits/HEMOGLOBIN A1C. Chart review completed for HM test overdue (mammograms, Colonoscopies, pap smears, DM labs, and/or EYE EXAMs)      Care Everywhere and media, updates requested and reviewed.    [facility] and [facility] reviewed.        Last hemoglobin A1C 7/5/2022 (10.1)

## 2022-11-01 ENCOUNTER — OFFICE VISIT (OUTPATIENT)
Dept: FAMILY MEDICINE | Facility: CLINIC | Age: 37
End: 2022-11-01
Payer: MEDICARE

## 2022-11-01 ENCOUNTER — CLINICAL SUPPORT (OUTPATIENT)
Dept: DIABETES | Facility: CLINIC | Age: 37
End: 2022-11-01
Payer: MEDICARE

## 2022-11-01 ENCOUNTER — LAB VISIT (OUTPATIENT)
Dept: LAB | Facility: HOSPITAL | Age: 37
End: 2022-11-01
Attending: STUDENT IN AN ORGANIZED HEALTH CARE EDUCATION/TRAINING PROGRAM
Payer: MEDICARE

## 2022-11-01 VITALS — BODY MASS INDEX: 59.07 KG/M2 | WEIGHT: 293 LBS | HEIGHT: 59 IN

## 2022-11-01 VITALS
DIASTOLIC BLOOD PRESSURE: 86 MMHG | WEIGHT: 293 LBS | TEMPERATURE: 99 F | SYSTOLIC BLOOD PRESSURE: 134 MMHG | RESPIRATION RATE: 20 BRPM | OXYGEN SATURATION: 99 % | HEART RATE: 112 BPM | BODY MASS INDEX: 59.07 KG/M2 | HEIGHT: 59 IN

## 2022-11-01 DIAGNOSIS — Z79.4 TYPE 2 DIABETES MELLITUS WITH HYPERGLYCEMIA, WITH LONG-TERM CURRENT USE OF INSULIN: Primary | ICD-10-CM

## 2022-11-01 DIAGNOSIS — Z79.4 TYPE 2 DIABETES MELLITUS TREATED WITH INSULIN: ICD-10-CM

## 2022-11-01 DIAGNOSIS — Z11.4 ENCOUNTER FOR SCREENING FOR HIV: ICD-10-CM

## 2022-11-01 DIAGNOSIS — E55.9 VITAMIN D DEFICIENCY: ICD-10-CM

## 2022-11-01 DIAGNOSIS — E78.2 MIXED HYPERLIPIDEMIA: ICD-10-CM

## 2022-11-01 DIAGNOSIS — G47.33 OBSTRUCTIVE SLEEP APNEA SYNDROME: ICD-10-CM

## 2022-11-01 DIAGNOSIS — J30.9 ALLERGIC RHINITIS, UNSPECIFIED SEASONALITY, UNSPECIFIED TRIGGER: ICD-10-CM

## 2022-11-01 DIAGNOSIS — F20.9 SCHIZOPHRENIA, UNSPECIFIED TYPE: ICD-10-CM

## 2022-11-01 DIAGNOSIS — Z11.59 ENCOUNTER FOR HEPATITIS C SCREENING TEST FOR LOW RISK PATIENT: ICD-10-CM

## 2022-11-01 DIAGNOSIS — I10 PRIMARY HYPERTENSION: ICD-10-CM

## 2022-11-01 DIAGNOSIS — F41.1 GENERALIZED ANXIETY DISORDER: ICD-10-CM

## 2022-11-01 DIAGNOSIS — Z23 NEED FOR INFLUENZA VACCINATION: ICD-10-CM

## 2022-11-01 DIAGNOSIS — G47.00 INSOMNIA, UNSPECIFIED TYPE: ICD-10-CM

## 2022-11-01 DIAGNOSIS — Z00.00 MEDICARE ANNUAL WELLNESS VISIT, SUBSEQUENT: ICD-10-CM

## 2022-11-01 DIAGNOSIS — E03.9 HYPOTHYROIDISM, UNSPECIFIED TYPE: ICD-10-CM

## 2022-11-01 DIAGNOSIS — E11.9 TYPE 2 DIABETES MELLITUS TREATED WITH INSULIN: ICD-10-CM

## 2022-11-01 DIAGNOSIS — D50.9 IRON DEFICIENCY ANEMIA, UNSPECIFIED IRON DEFICIENCY ANEMIA TYPE: ICD-10-CM

## 2022-11-01 DIAGNOSIS — E11.65 TYPE 2 DIABETES MELLITUS WITH HYPERGLYCEMIA, WITH LONG-TERM CURRENT USE OF INSULIN: Primary | ICD-10-CM

## 2022-11-01 DIAGNOSIS — Z00.00 MEDICARE ANNUAL WELLNESS VISIT, SUBSEQUENT: Primary | ICD-10-CM

## 2022-11-01 LAB
ALBUMIN SERPL-MCNC: 3.7 GM/DL (ref 3.5–5)
ALBUMIN/GLOB SERPL: 0.8 RATIO (ref 1.1–2)
ALP SERPL-CCNC: 76 UNIT/L (ref 40–150)
ALT SERPL-CCNC: 47 UNIT/L (ref 0–55)
AST SERPL-CCNC: 51 UNIT/L (ref 5–34)
BILIRUBIN DIRECT+TOT PNL SERPL-MCNC: 0.7 MG/DL
BUN SERPL-MCNC: 12 MG/DL (ref 7–18.7)
CALCIUM SERPL-MCNC: 9.3 MG/DL (ref 8.4–10.2)
CHLORIDE SERPL-SCNC: 104 MMOL/L (ref 98–107)
CHOLEST SERPL-MCNC: 185 MG/DL
CHOLEST/HDLC SERPL: 5 {RATIO} (ref 0–5)
CO2 SERPL-SCNC: 24 MMOL/L (ref 22–29)
CREAT SERPL-MCNC: 0.77 MG/DL (ref 0.55–1.02)
CREAT UR-MCNC: 119.4 MG/DL (ref 47–110)
ERYTHROCYTE [DISTWIDTH] IN BLOOD BY AUTOMATED COUNT: 13.1 % (ref 11.5–17)
EST. AVERAGE GLUCOSE BLD GHB EST-MCNC: 171.4 MG/DL
GFR SERPLBLD CREATININE-BSD FMLA CKD-EPI: >60 MLS/MIN/1.73/M2
GLOBULIN SER-MCNC: 4.5 GM/DL (ref 2.4–3.5)
GLUCOSE SERPL-MCNC: 180 MG/DL (ref 74–100)
HBA1C MFR BLD: 7.6 %
HCT VFR BLD AUTO: 39.2 % (ref 37–47)
HCV AB SERPL QL IA: NONREACTIVE
HDLC SERPL-MCNC: 41 MG/DL (ref 35–60)
HGB BLD-MCNC: 12.5 GM/DL (ref 12–16)
HIV 1+2 AB+HIV1 P24 AG SERPL QL IA: NONREACTIVE
LDLC SERPL CALC-MCNC: 111 MG/DL (ref 50–140)
MCH RBC QN AUTO: 30.5 PG (ref 27–31)
MCHC RBC AUTO-ENTMCNC: 31.9 MG/DL (ref 33–36)
MCV RBC AUTO: 95.6 FL (ref 80–94)
MICROALBUMIN UR-MCNC: 24.6 UG/ML
MICROALBUMIN/CREAT RATIO PNL UR: 20.6 MG/GM CR (ref 0–30)
NRBC BLD AUTO-RTO: 0 %
PLATELET # BLD AUTO: 371 X10(3)/MCL (ref 130–400)
PMV BLD AUTO: 8.8 FL (ref 7.4–10.4)
POTASSIUM SERPL-SCNC: 4.3 MMOL/L (ref 3.5–5.1)
PROT SERPL-MCNC: 8.2 GM/DL (ref 6.4–8.3)
RBC # BLD AUTO: 4.1 X10(6)/MCL (ref 4.2–5.4)
SODIUM SERPL-SCNC: 138 MMOL/L (ref 136–145)
TRIGL SERPL-MCNC: 167 MG/DL (ref 37–140)
TSH SERPL-ACNC: 3.42 UIU/ML (ref 0.35–4.94)
VLDLC SERPL CALC-MCNC: 33 MG/DL
WBC # SPEC AUTO: 11.3 X10(3)/MCL (ref 4.5–11.5)

## 2022-11-01 PROCEDURE — G0008 ADMIN INFLUENZA VIRUS VAC: HCPCS | Mod: ,,, | Performed by: STUDENT IN AN ORGANIZED HEALTH CARE EDUCATION/TRAINING PROGRAM

## 2022-11-01 PROCEDURE — G0108 PR DIAB MANAGE TRN  PER INDIV: ICD-10-PCS | Mod: ,,, | Performed by: INTERNAL MEDICINE

## 2022-11-01 PROCEDURE — 83036 HEMOGLOBIN GLYCOSYLATED A1C: CPT

## 2022-11-01 PROCEDURE — G0008 FLU VACCINE (QUAD) GREATER THAN OR EQUAL TO 3YO PRESERVATIVE FREE IM: ICD-10-PCS | Mod: ,,, | Performed by: STUDENT IN AN ORGANIZED HEALTH CARE EDUCATION/TRAINING PROGRAM

## 2022-11-01 PROCEDURE — G0439 PPPS, SUBSEQ VISIT: HCPCS | Mod: ,,, | Performed by: STUDENT IN AN ORGANIZED HEALTH CARE EDUCATION/TRAINING PROGRAM

## 2022-11-01 PROCEDURE — 86803 HEPATITIS C AB TEST: CPT

## 2022-11-01 PROCEDURE — 87389 HIV-1 AG W/HIV-1&-2 AB AG IA: CPT

## 2022-11-01 PROCEDURE — G0108 DIAB MANAGE TRN  PER INDIV: HCPCS | Mod: ,,, | Performed by: INTERNAL MEDICINE

## 2022-11-01 PROCEDURE — 80053 COMPREHEN METABOLIC PANEL: CPT

## 2022-11-01 PROCEDURE — 36415 COLL VENOUS BLD VENIPUNCTURE: CPT

## 2022-11-01 PROCEDURE — 80061 LIPID PANEL: CPT

## 2022-11-01 PROCEDURE — G0439 PR MEDICARE ANNUAL WELLNESS SUBSEQUENT VISIT: ICD-10-PCS | Mod: ,,, | Performed by: STUDENT IN AN ORGANIZED HEALTH CARE EDUCATION/TRAINING PROGRAM

## 2022-11-01 PROCEDURE — 90686 IIV4 VACC NO PRSV 0.5 ML IM: CPT | Mod: ,,, | Performed by: STUDENT IN AN ORGANIZED HEALTH CARE EDUCATION/TRAINING PROGRAM

## 2022-11-01 PROCEDURE — 85027 COMPLETE CBC AUTOMATED: CPT

## 2022-11-01 PROCEDURE — 90686 FLU VACCINE (QUAD) GREATER THAN OR EQUAL TO 3YO PRESERVATIVE FREE IM: ICD-10-PCS | Mod: ,,, | Performed by: STUDENT IN AN ORGANIZED HEALTH CARE EDUCATION/TRAINING PROGRAM

## 2022-11-01 PROCEDURE — 84443 ASSAY THYROID STIM HORMONE: CPT

## 2022-11-01 RX ORDER — PEN NEEDLE, DIABETIC 31 GX5/16"
NEEDLE, DISPOSABLE MISCELLANEOUS
Qty: 100 EACH | Refills: 3 | Status: SHIPPED | OUTPATIENT
Start: 2022-11-01 | End: 2023-11-02

## 2022-11-01 RX ORDER — SITAGLIPTIN AND METFORMIN HYDROCHLORIDE 1000; 50 MG/1; MG/1
1 TABLET, FILM COATED, EXTENDED RELEASE ORAL DAILY
Qty: 90 TABLET | Refills: 0 | Status: SHIPPED | OUTPATIENT
Start: 2022-11-01 | End: 2022-11-02 | Stop reason: DRUGHIGH

## 2022-11-01 RX ORDER — GLIMEPIRIDE 4 MG/1
4 TABLET ORAL DAILY
Qty: 90 TABLET | Refills: 0 | Status: SHIPPED | OUTPATIENT
Start: 2022-11-01 | End: 2023-01-10

## 2022-11-01 NOTE — PROGRESS NOTES
Subjective:      Patient ID: Kimi Bell is a 37 y.o.  female.     Chief Complaint: Medicare Wellness    Preventative Health: Patient amenable to flu vaccine. Patient would like to defer Pap smear at this time.    NIDDMII: Patient states she checks her blood sugars BID. Patient states her blood sugars have been averaging 110s-200s. Patient following with [facility] for her eye exams. Patient taking Novolog 33U BID, Janumet XR nightly, Amaryl daily, and Lantus 8U daily. She is following with Diabetes Education and has an appointment with them after this appointment.     HTN/HLD: /86. Patient taking Losartan, Metoprolol, and Lipitor.    Hypothyroidism: Patient states she used to be on thyroid medication, but this was discontinued due to stable thyroid numbers. TSH negative from 07/15/21.    Schizophrenia/Anxiety/Insomnia: Patient following with Psychiatry. Patient taking Abilify, Benztropine, and Effexor.    THEODORE: Patient following with Neurology. She states compliance with CPAP nightly.    Iron Deficiency Anemia: Patient states compliance with iron supplementation.    Vitamin D Deficiency: Vitamin D level negative from 07/15/21.    Allergic Rhinitis: Patient not taking anything consistently.    Review of Systems   Constitutional:  Negative for activity change, fatigue and fever.   Eyes:  Negative for visual disturbance.   Respiratory:  Negative for apnea, cough and shortness of breath.    Cardiovascular:  Negative for chest pain and leg swelling.   Gastrointestinal:  Negative for abdominal pain, diarrhea and nausea.   Genitourinary:  Negative for dysuria and hematuria.   Musculoskeletal:  Negative for arthralgias, back pain and myalgias.   Skin:  Negative for rash and wound.   Allergic/Immunologic: Positive for environmental allergies.   Neurological:  Negative for dizziness, weakness, numbness and headaches.   Psychiatric/Behavioral:  Negative for behavioral problems and  "dysphoric mood.      Objective:   /86 (BP Location: Right arm, Patient Position: Sitting, BP Method: Large (Automatic))   Pulse (!) 112   Temp 98.6 °F (37 °C) (Temporal)   Resp 20   Ht 4' 11" (1.499 m)   Wt (!) 143.4 kg (316 lb 1.6 oz)   LMP 10/10/2022   SpO2 99%   BMI 63.84 kg/m²     Physical Exam  Vitals and nursing note reviewed.   Constitutional:       General: She is not in acute distress.     Appearance: Normal appearance. She is not ill-appearing or toxic-appearing.   HENT:      Head: Normocephalic and atraumatic.      Mouth/Throat:      Mouth: Mucous membranes are moist.      Pharynx: Oropharynx is clear.   Cardiovascular:      Rate and Rhythm: Normal rate and regular rhythm.      Pulses:           Dorsalis pedis pulses are 3+ on the right side and 3+ on the left side.      Heart sounds: Normal heart sounds. No murmur heard.  Pulmonary:      Effort: Pulmonary effort is normal. No respiratory distress.      Breath sounds: Normal breath sounds.   Abdominal:      General: There is no distension.      Palpations: Abdomen is soft.      Tenderness: There is no abdominal tenderness.   Musculoskeletal:         General: No deformity.      Cervical back: Neck supple. No tenderness.      Right lower leg: No edema.      Left lower leg: No edema.      Right foot: No deformity.      Left foot: No deformity.   Feet:      Right foot:      Protective Sensation: 10 sites tested.  10 sites sensed.      Skin integrity: Skin integrity normal.      Toenail Condition: Right toenails are normal.      Left foot:      Protective Sensation: 10 sites tested.  10 sites sensed.      Skin integrity: Skin integrity normal.      Toenail Condition: Left toenails are normal.   Lymphadenopathy:      Cervical: No cervical adenopathy.   Skin:     General: Skin is warm and dry.      Findings: No lesion or rash.   Neurological:      General: No focal deficit present.      Mental Status: She is alert. Mental status is at baseline. " "  Psychiatric:         Mood and Affect: Mood normal.         Behavior: Behavior normal.         Thought Content: Thought content normal.         Judgment: Judgment normal.     Assessment/Plan:   1. Medicare annual wellness visit, subsequent  -     Hepatitis C Antibody; Future; Expected date: 11/01/2022  -     HIV 1/2 Ag/Ab (4th Gen); Future; Expected date: 11/01/2022  -     Influenza - Quadrivalent (PF)    2. Encounter for screening for HIV  -     HIV 1/2 Ag/Ab (4th Gen); Future; Expected date: 11/01/2022    3. Encounter for hepatitis C screening test for low risk patient  -     Hepatitis C Antibody; Future; Expected date: 11/01/2022    4. Type 2 diabetes mellitus treated with insulin  Assessment & Plan:  - A1c for routine monitoring.  - Patient following with Diabetes Education.  - Patient following with [facility] for eye exams.  - Diabetic foot exam performed.  - Continue Lantus 8U daily.  - Continue Novolog 33U BID.  - Continue Janumet XR 50mg-1000mg (2 tablets) nightly and Amaryl 4mg daily.    Orders:  -     Hemoglobin A1C; Future; Expected date: 11/01/2022  -     Microalbumin/Creatinine Ratio, Urine  -     CBC Without Differential; Future; Expected date: 11/01/2022  -     Comprehensive Metabolic Panel; Future; Expected date: 11/01/2022  -     JANUMET XR 50-1,000 mg TM24; Take 1 tablet by mouth once daily.  Dispense: 90 tablet; Refill: 0  -     glimepiride (AMARYL) 4 MG tablet; Take 1 tablet (4 mg total) by mouth once daily.  Dispense: 90 tablet; Refill: 0  -     BD ULTRA-FINE SHORT PEN NEEDLE 31 gauge x 5/16" Ndle; Use with insulin pen as directed  Dispense: 100 each; Refill: 3    5. Primary hypertension  Assessment & Plan:  - /86, well-controlled.  - Continue Losartan 50mg daily and Metoprolol 50mg BID.    Orders:  -     CBC Without Differential; Future; Expected date: 11/01/2022  -     Comprehensive Metabolic Panel; Future; Expected date: 11/01/2022    6. Mixed hyperlipidemia  Assessment & " Plan:  - Continue Lipitor 10mg daily.    Orders:  -     Lipid Panel; Future; Expected date: 11/01/2022    7. Need for influenza vaccination  -     Influenza - Quadrivalent (PF)    8. Hypothyroidism, unspecified type  Assessment & Plan:  - Patient states she used to be on thyroid medication, but this was discontinued due to stable thyroid numbers.      Orders:  -     TSH; Future; Expected date: 11/01/2022    9. Schizophrenia, unspecified type  Assessment & Plan:  - Patient following with Psychiatry.  - Abilify 30mg daily, Benztropine 1mg BID, and Effexor XR 140mg daily per Psychiatry.        10. Generalized anxiety disorder  Assessment & Plan:  - Refer to Schizophrenia plan.      11. Insomnia, unspecified type  Assessment & Plan:  - Refer to Schizophrenia plan.      12. Obstructive sleep apnea syndrome  Assessment & Plan:  - Patient following with Neurology.  - Patient states compliance with CPAP nightly.        13. Iron deficiency anemia, unspecified iron deficiency anemia type  Assessment & Plan:  - Continue iron supplementation.        14. Vitamin D deficiency    15. Allergic rhinitis, unspecified seasonality, unspecified trigger  Assessment & Plan:  - Patient recommended to try Flonase and anti-histamines OTC.         Opioid Screening: Patient medication list reviewed, patient is not taking prescription opioids. Patient is not using additional opioids than prescribed. Patient is at low risk of substance abuse based on this opioid use history.     Patient Reported Health Risk Assessment  What is your age?:  (37)  Are you male or female?: Female  During the past four weeks, how much have you been bothered by emotional problems such as feeling anxious, depressed, irritable, sad, or downhearted and blue?: Not at all  During the past five weeks, has your physical and/or emotional health limited your social activities with family, friends, neighbors, or groups?: Not at all  During the past four weeks, how much bodily  pain have you generally had?: Very mild pain  During the past four weeks, was someone available to help if you needed and wanted help?: Yes, as much as I wanted  During the past four weeks, what was the hardest physical activity you could do for at least two minutes?: Light  Can you get to places out of walking distance without help?  (For example, can you travel alone on buses or taxis, or drive your own car?): Yes  Can you go shopping for groceries or clothes without someone's help?: Yes  Can you prepare your own meals?: No  Can you do your own housework without help?: No  Because of any health problems, do you need the help of another person with your personal care needs such as eating, bathing, dressing, or getting around the house?: No  Can you handle your own money without help?: Yes  During the past four weeks, how would you rate your health in general?: Very good  How have things been going for you during the past four weeks?: Pretty well  Are you having difficulties driving your car?: Not applicable, I do not use a car  Do you always fasten your seat belt when you are in a car?: Yes, usually  How often in the past four weeks have you been bothered by falling or dizzy when standing up?: Never  How often in the past four weeks have you been bothered by trouble eating well?: Never  How often in the past four weeks have you been bothered by teeth or denture problems?: Never  How often in the past four weeks have you been bothered with problems using the telephone?: Never  How often in the past four weeks have you been bothered by tiredness or fatigue?: Sometimes  Have you fallen two or more times in the past year?: No  Are you afraid of falling?: No  Are you a smoker?: No  During the past four weeks, how many drinks of wine, beer, or other alcoholic beverages did you have?: No alcohol at all  Do you exercise for about 20 minutes three or more days a week?: No, I usually do not exercise this much  Have you been  given any information to help you with hazards in your house that might hurt you?: No  Have you been given any information to help you with keeping track of your medications?: No  How often do you have trouble taking medicines the way you've been told to take them?: I always take them as prescribed  How confident are you that you can control and manage most of your health problems?: Somewhat confident  What is your race? (Check all that apply.):     Medicare Annual Wellness and Personalized Prevention Plan:   Fall Risk + Home Safety + Hearing Impairment + Depression Screen + Opioid and Substance Abuse Screening + Cognitive Impairment Screen + Health Risk Assessment all reviewed.     Advance Care Planning   I attest to discussing Advance Care Planning with patient and/or family member.  Education was provided including the importance of the Health Care Power of , Advance Directives, and/or [place] documentation.  The patient expressed understanding to the importance of this information and discussion.       Follow up in about 3 months (around 2/1/2023) for Chronic Medical Management.

## 2022-11-01 NOTE — ASSESSMENT & PLAN NOTE
- A1c for routine monitoring.  - Patient following with Diabetes Education.  - Patient following with [facility] for eye exams.  - Diabetic foot exam performed.  - Continue Lantus 8U daily.  - Continue Novolog 33U BID.  - Continue Janumet XR 50mg-1000mg (2 tablets) nightly and Amaryl 4mg daily.

## 2022-11-01 NOTE — PROGRESS NOTES
Diabetes Care Specialist Progress Note  Author: Faustina Ram RN  Date: 11/1/2022    Program Intake  Reason for Diabetes Program Visit:: Intervention  Type of Intervention:: Individual  Individual: Education  Education: Nutrition and Meal Planning  Current diabetes risk level:: high  In the last 12 months, have you:: none  Permission to speak with others about care:: yes    Lab Results   Component Value Date    HGBA1C 7.6 (H) 11/01/2022       Clinical                                  Additional Social History                                    Diabetes Self-Management Skills Assessment                                              Diabetes Self Support Plan         Assessment Summary and Plan    Based on today's diabetes care assessment, the following areas of need were identified:      Social 7/5/2022   Support No   Access to Mass Media/Tech No   Cognitive/Behavioral Health No   Culture/Mosque No   Communication No   Health Literacy No        Clinical 7/5/2022   Medication Adherence No   Lab Compliance No   Nutritional Status Yes        Diabetes Self-Management Skills 7/5/2022   Diabetes Disease Process/Treatment Options No   Nutrition/Healthy Eating Yes   Physical Activity/Exercise Yes   Medication No   Home Blood Glucose Monitoring No   Acute Complications No   Chronic Complications No   Psychosocial/Coping No          Today's interventions were provided through individual discussion, instruction, and written materials were provided.      Patient verbalized understanding of instruction and written materials.  Pt was able to return back demonstration of instructions today. Patient understood key points, needs reinforcement and further instruction.     Diabetes Self-Management Care Plan:    Today's Diabetes Self-Management Care Plan was developed with Kimi's input. Kimi has agreed to work toward the following goal(s) to improve his/her overall diabetes control.      Care Plan: Diabetes Management    Updates made since 10/2/2022 12:00 AM        Problem: Physical Activity and Exercise         Long-Range Goal: Patient agrees to increase physical activity to a goal of 7 times per week for 6 minutes.    Start Date: 7/5/2022   This Visit's Progress: On track   Priority: Medium   Barriers: Physical Limitations   Note:    States walks to mailbox 3 days a week but will add it every day or do her 3lb weights that she has at home.        Problem: Healthy Eating         Long-Range Goal: Add 1-2  non-starchy vegetables to meals daily    Start Date: 11/1/2022   Priority: High   Barriers: Lack of Motivation to Change   Note:    Meal planning discussed and meal ideas and photos shown.  Assisted with grocery list to have more non-starchy foods available.  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        Task: Reviewed the sources and role of Carbohydrate, Protein, and Fat and how each nutrient impacts blood sugar. Completed 11/1/2022        Task: Provided visual examples using dry measuring cups, food models, and other familiar objects such as computer mouse, deck or cards, tennis ball etc. to help with visualization of portions. Completed 11/1/2022        Task: Discussed strategies for choosing healthier menu options when dining out. Completed 11/1/2022        Task: Recommended replacing beverages containing high sugar content with noncaloric/sugar free options and/or water. Completed 11/1/2022        Task: Review the importance of balancing carbohydrates with each meal using portion control techniques to count servings of carbohydrate and label reading to identify serving size and amount of total carbs per serving. Completed 11/1/2022        Task: Provided Sample plate method and reviewed the use of the plate to estimate amounts of carbohydrate per meal. Completed 11/1/2022          Follow Up Plan     Follow up in about 3 months (around 2/1/2023) for skills  review .    Today's care plan and follow up schedule was discussed with patient.  Kimi verbalized understanding of the care plan, goals, and agrees to follow up plan.        The patient was encouraged to communicate with his/her health care provider/physician and care team regarding his/her condition(s) and treatment.  I provided the patient with my contact information today and encouraged to contact me via phone or [facility] Portal as needed.     Length of Visit   Total Time: 30 Minutes   Diabetes Care Specialist Progress Note  Author: Faustina Ram RN  Date: 11/1/2022    Program Intake  Reason for Diabetes Program Visit:: Intervention  Type of Intervention:: Individual  Individual: Education  Education: Nutrition and Meal Planning  Current diabetes risk level:: high  In the last 12 months, have you:: none  Permission to speak with others about care:: yes    Lab Results   Component Value Date    HGBA1C 7.6 (H) 11/01/2022       Clinical                                  Additional Social History                                    Diabetes Self-Management Skills Assessment                                              Diabetes Self Support Plan         Assessment Summary and Plan    Based on today's diabetes care assessment, the following areas of need were identified:      Social 7/5/2022   Support No   Access to "[employer] Media/Tech No   Cognitive/Behavioral Health No   Culture/Protestant No   Communication No   Health Literacy No        Clinical 7/5/2022   Medication Adherence No   Lab Compliance No   Nutritional Status Yes        Diabetes Self-Management Skills 7/5/2022   Diabetes Disease Process/Treatment Options No   Nutrition/Healthy Eating Yes   Physical Activity/Exercise Yes   Medication No   Home Blood Glucose Monitoring No   Acute Complications No   Chronic Complications No   Psychosocial/Coping No          Today's interventions were provided through individual discussion, instruction, and written  materials were provided.      Patient verbalized understanding of instruction and written materials.  Pt was able to return back demonstration of instructions today. Patient understood key points, needs reinforcement and further instruction.     Diabetes Self-Management Care Plan:    Today's Diabetes Self-Management Care Plan was developed with Kimi's input. Kimi has agreed to work toward the following goal(s) to improve his/her overall diabetes control.      Care Plan: Diabetes Management   Updates made since 10/2/2022 12:00 AM        Problem: Physical Activity and Exercise         Long-Range Goal: Patient agrees to increase physical activity to a goal of 7 times per week for 6 minutes.    Start Date: 7/5/2022   This Visit's Progress: On track   Priority: Medium   Barriers: Physical Limitations   Note:    States walks to mailbox 3 days a week but will add it every day or do her 3lb weights that she has at home.        Problem: Healthy Eating         Long-Range Goal: Add 1-2  non-starchy vegetables to meals daily    Start Date: 11/1/2022   Priority: High   Barriers: Lack of Motivation to Change   Note:    Meal planning discussed and meal ideas and photos shown.  Assisted with grocery list to have more non-starchy foods available.  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        Task: Reviewed the sources and role of Carbohydrate, Protein, and Fat and how each nutrient impacts blood sugar. Completed 11/1/2022        Task: Provided visual examples using dry measuring cups, food models, and other familiar objects such as computer mouse, deck or cards, tennis ball etc. to help with visualization of portions. Completed 11/1/2022        Task: Discussed strategies for choosing healthier menu options when dining out. Completed 11/1/2022        Task: Recommended replacing beverages containing high sugar content with noncaloric/sugar free options and/or  water. Completed 11/1/2022        Task: Review the importance of balancing carbohydrates with each meal using portion control techniques to count servings of carbohydrate and label reading to identify serving size and amount of total carbs per serving. Completed 11/1/2022        Task: Provided Sample plate method and reviewed the use of the plate to estimate amounts of carbohydrate per meal. Completed 11/1/2022          Follow Up Plan     Follow up in about 3 months (around 2/1/2023) for skills review . Appt. Made with pt. Today for 1/31/2023.     Today's care plan and follow up schedule was discussed with patient.  Kimi verbalized understanding of the care plan, goals, and agrees to follow up plan.        The patient was encouraged to communicate with his/her health care provider/physician and care team regarding his/her condition(s) and treatment.  I provided the patient with my contact information today and encouraged to contact me via phone or [facility] Patient Portal as needed.     Length of Visit   Total Time: 30 Minutes

## 2022-11-01 NOTE — ASSESSMENT & PLAN NOTE
- Patient states she used to be on thyroid medication, but this was discontinued due to stable thyroid numbers.

## 2022-11-02 ENCOUNTER — TELEPHONE (OUTPATIENT)
Dept: DIABETES | Facility: CLINIC | Age: 37
End: 2022-11-02
Payer: MEDICARE

## 2022-11-02 DIAGNOSIS — E78.2 MIXED HYPERLIPIDEMIA: Primary | ICD-10-CM

## 2022-11-02 DIAGNOSIS — Z79.4 TYPE 2 DIABETES MELLITUS TREATED WITH INSULIN: Primary | ICD-10-CM

## 2022-11-02 DIAGNOSIS — E11.9 TYPE 2 DIABETES MELLITUS TREATED WITH INSULIN: Primary | ICD-10-CM

## 2022-11-02 RX ORDER — INSULIN ASPART 100 [IU]/ML
33 INJECTION, SOLUTION INTRAVENOUS; SUBCUTANEOUS 2 TIMES DAILY WITH MEALS
Qty: 59.4 ML | Refills: 3 | Status: SHIPPED | OUTPATIENT
Start: 2022-11-02 | End: 2023-05-09

## 2022-11-02 RX ORDER — ATORVASTATIN CALCIUM 20 MG/1
20 TABLET, FILM COATED ORAL DAILY
Qty: 90 TABLET | Refills: 0 | Status: SHIPPED | OUTPATIENT
Start: 2022-11-02 | End: 2023-01-31 | Stop reason: SDUPTHER

## 2022-11-02 NOTE — TELEPHONE ENCOUNTER
Called pt to confirm medication.  She is currently taking Lantus 8u qam, Novolog 33units bid with breakfast and supper.  Glimepiride 4mg daily in am, januet xr  bid.  She eats 2 larger meals a day and that is when she takes novolog, she eats a snack in middle of day or smaller meal. She state she doesn't miss medications and uses pill case. Discussed drop in A1C and she is very happy about this.  Encouraged her to continue to work on diet and exercise. She will call me next Wednesday with glucose readings.

## 2022-11-02 NOTE — TELEPHONE ENCOUNTER
I have ordered the following medications. Please notify the patient. Please let me know if there's another Novolog that needs to be ordered for better coverage if the one below isn't covered as well, thank you!    Medications Ordered This Encounter   Medications    insulin aspart U-100 (NOVOLOG FLEXPEN U-100 INSULIN) 100 unit/mL (3 mL) InPn pen     Sig: Inject 33 Units into the skin 2 (two) times daily with meals.     Dispense:  59.4 mL     Refill:  3

## 2022-11-14 ENCOUNTER — TELEPHONE (OUTPATIENT)
Dept: DIABETES | Facility: CLINIC | Age: 37
End: 2022-11-14
Payer: MEDICARE

## 2022-11-14 NOTE — TELEPHONE ENCOUNTER
Blood sugars look like they're improving! How much insulin is she taking?     Faustina are you following her sugars weekly? Thank you for your assistance!

## 2022-11-29 ENCOUNTER — DOCUMENTATION ONLY (OUTPATIENT)
Dept: ADMINISTRATIVE | Facility: HOSPITAL | Age: 37
End: 2022-11-29
Payer: MEDICARE

## 2022-12-07 ENCOUNTER — DOCUMENTATION ONLY (OUTPATIENT)
Dept: PRIMARY CARE CLINIC | Facility: CLINIC | Age: 37
End: 2022-12-07
Payer: MEDICARE

## 2022-12-23 ENCOUNTER — TELEPHONE (OUTPATIENT)
Dept: FAMILY MEDICINE | Facility: CLINIC | Age: 37
End: 2022-12-23
Payer: MEDICARE

## 2022-12-23 DIAGNOSIS — E11.9 TYPE 2 DIABETES MELLITUS TREATED WITH INSULIN: Primary | ICD-10-CM

## 2022-12-23 DIAGNOSIS — Z79.4 TYPE 2 DIABETES MELLITUS TREATED WITH INSULIN: Primary | ICD-10-CM

## 2022-12-23 NOTE — TELEPHONE ENCOUNTER
----- Message from Ewelina Thao sent at 12/23/2022 10:46 AM CST -----  Regarding: med refill  .Type:  RX Refill Request    Who Called: pt   Refill or New Rx:refill   RX Name and Strength SITagliptan-metformin (JANUMET) 50-1,000 mg per tablet  How is the patient currently taking it? (ex. 1XDay):2xday   Is this a 30 day or 90 day RX:  [id number] [id number] [facility] [address]  Local or Mail Order:local   Ordering Provider:Stacy   Would the patient rather a call back or a response via [facility]? Call back   Best Call Back Number:[phone]  Additional Information:

## 2023-01-09 DIAGNOSIS — E11.9 TYPE 2 DIABETES MELLITUS TREATED WITH INSULIN: Primary | ICD-10-CM

## 2023-01-09 DIAGNOSIS — Z79.4 TYPE 2 DIABETES MELLITUS TREATED WITH INSULIN: Primary | ICD-10-CM

## 2023-01-10 RX ORDER — GLIMEPIRIDE 4 MG/1
TABLET ORAL
Qty: 90 TABLET | Refills: 3 | Status: SHIPPED | OUTPATIENT
Start: 2023-01-10 | End: 2023-01-31 | Stop reason: SDUPTHER

## 2023-01-29 DIAGNOSIS — Z79.4 TYPE 2 DIABETES MELLITUS TREATED WITH INSULIN: ICD-10-CM

## 2023-01-29 DIAGNOSIS — E11.9 TYPE 2 DIABETES MELLITUS TREATED WITH INSULIN: ICD-10-CM

## 2023-01-29 DIAGNOSIS — E78.2 MIXED HYPERLIPIDEMIA: Primary | ICD-10-CM

## 2023-01-30 RX ORDER — SITAGLIPTIN AND METFORMIN HYDROCHLORIDE 1000; 50 MG/1; MG/1
TABLET, FILM COATED, EXTENDED RELEASE ORAL
Qty: 90 TABLET | Refills: 0 | OUTPATIENT
Start: 2023-01-30

## 2023-01-30 RX ORDER — ATORVASTATIN CALCIUM 20 MG/1
20 TABLET, FILM COATED ORAL DAILY
Qty: 90 TABLET | Refills: 0 | Status: SHIPPED | OUTPATIENT
Start: 2023-01-30 | End: 2023-05-01

## 2023-01-30 RX ORDER — ATORVASTATIN CALCIUM 20 MG/1
TABLET, FILM COATED ORAL
Qty: 90 TABLET | Refills: 3 | OUTPATIENT
Start: 2023-01-30

## 2023-01-30 NOTE — TELEPHONE ENCOUNTER
Please advise on refills on the Atorvastatin. You can refuse the Janumet as her dosage was changed and a new script was sent in 12/2022

## 2023-01-31 ENCOUNTER — CLINICAL SUPPORT (OUTPATIENT)
Dept: DIABETES | Facility: CLINIC | Age: 38
End: 2023-01-31
Payer: MEDICARE

## 2023-01-31 ENCOUNTER — OFFICE VISIT (OUTPATIENT)
Dept: FAMILY MEDICINE | Facility: CLINIC | Age: 38
End: 2023-01-31
Payer: MEDICARE

## 2023-01-31 VITALS
OXYGEN SATURATION: 99 % | HEIGHT: 59 IN | RESPIRATION RATE: 18 BRPM | WEIGHT: 293 LBS | TEMPERATURE: 98 F | DIASTOLIC BLOOD PRESSURE: 82 MMHG | HEART RATE: 106 BPM | BODY MASS INDEX: 59.07 KG/M2 | SYSTOLIC BLOOD PRESSURE: 130 MMHG

## 2023-01-31 VITALS — BODY MASS INDEX: 61.6 KG/M2 | WEIGHT: 293 LBS

## 2023-01-31 DIAGNOSIS — G47.00 INSOMNIA, UNSPECIFIED TYPE: ICD-10-CM

## 2023-01-31 DIAGNOSIS — G47.33 OBSTRUCTIVE SLEEP APNEA SYNDROME: ICD-10-CM

## 2023-01-31 DIAGNOSIS — J30.9 ALLERGIC RHINITIS, UNSPECIFIED SEASONALITY, UNSPECIFIED TRIGGER: ICD-10-CM

## 2023-01-31 DIAGNOSIS — Z79.4 TYPE 2 DIABETES MELLITUS TREATED WITH INSULIN: Primary | ICD-10-CM

## 2023-01-31 DIAGNOSIS — E55.9 VITAMIN D DEFICIENCY: ICD-10-CM

## 2023-01-31 DIAGNOSIS — F41.1 GENERALIZED ANXIETY DISORDER: ICD-10-CM

## 2023-01-31 DIAGNOSIS — Z79.4 TYPE 2 DIABETES MELLITUS WITH HYPERGLYCEMIA, WITH LONG-TERM CURRENT USE OF INSULIN: Primary | ICD-10-CM

## 2023-01-31 DIAGNOSIS — E11.65 TYPE 2 DIABETES MELLITUS WITH HYPERGLYCEMIA, WITH LONG-TERM CURRENT USE OF INSULIN: Primary | ICD-10-CM

## 2023-01-31 DIAGNOSIS — F20.9 SCHIZOPHRENIA, UNSPECIFIED TYPE: ICD-10-CM

## 2023-01-31 DIAGNOSIS — E11.9 TYPE 2 DIABETES MELLITUS TREATED WITH INSULIN: Primary | ICD-10-CM

## 2023-01-31 DIAGNOSIS — Z23 NEED FOR PNEUMOCOCCAL VACCINATION: ICD-10-CM

## 2023-01-31 DIAGNOSIS — E78.2 MIXED HYPERLIPIDEMIA: ICD-10-CM

## 2023-01-31 DIAGNOSIS — I10 PRIMARY HYPERTENSION: ICD-10-CM

## 2023-01-31 DIAGNOSIS — E03.9 HYPOTHYROIDISM, UNSPECIFIED TYPE: ICD-10-CM

## 2023-01-31 DIAGNOSIS — D50.9 IRON DEFICIENCY ANEMIA, UNSPECIFIED IRON DEFICIENCY ANEMIA TYPE: ICD-10-CM

## 2023-01-31 PROCEDURE — G0009 PNEUMOCOCCAL CONJUGATE VACCINE 20-VALENT: ICD-10-PCS | Mod: ,,, | Performed by: STUDENT IN AN ORGANIZED HEALTH CARE EDUCATION/TRAINING PROGRAM

## 2023-01-31 PROCEDURE — 90677 PCV20 VACCINE IM: CPT | Mod: ,,, | Performed by: STUDENT IN AN ORGANIZED HEALTH CARE EDUCATION/TRAINING PROGRAM

## 2023-01-31 PROCEDURE — 90677 PNEUMOCOCCAL CONJUGATE VACCINE 20-VALENT: ICD-10-PCS | Mod: ,,, | Performed by: STUDENT IN AN ORGANIZED HEALTH CARE EDUCATION/TRAINING PROGRAM

## 2023-01-31 PROCEDURE — G0108 PR DIAB MANAGE TRN  PER INDIV: ICD-10-PCS | Mod: ,,, | Performed by: INTERNAL MEDICINE

## 2023-01-31 PROCEDURE — 99214 OFFICE O/P EST MOD 30 MIN: CPT | Mod: ,,, | Performed by: STUDENT IN AN ORGANIZED HEALTH CARE EDUCATION/TRAINING PROGRAM

## 2023-01-31 PROCEDURE — G0009 ADMIN PNEUMOCOCCAL VACCINE: HCPCS | Mod: ,,, | Performed by: STUDENT IN AN ORGANIZED HEALTH CARE EDUCATION/TRAINING PROGRAM

## 2023-01-31 PROCEDURE — G0108 DIAB MANAGE TRN  PER INDIV: HCPCS | Mod: ,,, | Performed by: INTERNAL MEDICINE

## 2023-01-31 PROCEDURE — 99214 PR OFFICE/OUTPT VISIT, EST, LEVL IV, 30-39 MIN: ICD-10-PCS | Mod: ,,, | Performed by: STUDENT IN AN ORGANIZED HEALTH CARE EDUCATION/TRAINING PROGRAM

## 2023-01-31 RX ORDER — CETIRIZINE HYDROCHLORIDE 10 MG/1
10 TABLET ORAL DAILY
Qty: 90 TABLET | Refills: 3 | Status: SHIPPED | OUTPATIENT
Start: 2023-01-31 | End: 2023-05-09 | Stop reason: SDUPTHER

## 2023-01-31 RX ORDER — GLIMEPIRIDE 4 MG/1
4 TABLET ORAL DAILY
Qty: 90 TABLET | Refills: 3 | Status: SHIPPED | OUTPATIENT
Start: 2023-01-31 | End: 2023-05-09 | Stop reason: SDUPTHER

## 2023-01-31 NOTE — ASSESSMENT & PLAN NOTE
- A1c for routine monitoring.  - Patient following with Diabetes Education.  - Patient following with [facility] for eye exams.  - Diabetic foot exam performed.  - Continue Lantus 8U daily.  - Continue Novolog 33U BID.  - Continue Janumet XR 50mg-1000mg BID and Amaryl 4mg daily.

## 2023-01-31 NOTE — ASSESSMENT & PLAN NOTE
- Patient states she used to be on thyroid medication, but this was discontinued due to stable thyroid numbers.  - Continue to monitor.

## 2023-01-31 NOTE — ASSESSMENT & PLAN NOTE
- Stable.  - Patient following with Psychiatry.  - Abilify 30mg daily, Benztropine 1mg BID, and Effexor XR 140mg daily per Psychiatry.

## 2023-01-31 NOTE — PROGRESS NOTES
Diabetes Care Specialist Progress Note  Author: Faustina Ram RN  Date: 1/31/2023    Program Intake  Reason for Diabetes Program Visit:: Intervention  Type of Intervention:: Individual  Individual: Education  Education: Self-Management Skill Review  Current diabetes risk level:: low  Permission to speak with others about care:: yes    Lab Results   Component Value Date    HGBA1C 7.6 (H) 11/01/2022       Clinical                                  Additional Social History                                    Diabetes Self-Management Skills Assessment                   Medications  Diabetes management routine:: oral medications, insulin (Lantus 8units Qhs, Novolog 33 units bid, glimepiride 4mg daily, janumet xr 50mg-1000mg bid)  Patient is able to identify current diabetes medications, dosages, and appropriate timing of medications.: no  Patient understands the purpose of the medications taken for diabetes.: yes  Patient reports problems or concerns with current medication regimen.: no  Medication Skills Assessment Completed:: Yes  Assessment indicates:: Knowledge deficit, Instruction Needed  Area of need?: Yes    Home Blood Glucose Monitoring  How often do you check your blood sugar?: Once a day  When do you check your blood sugar?: Before breakfast  When you check what is your typical blood sugar range? : 104-178  Blood glucose logs:: yes, encouraged to keep logs, encouraged to bring logs to provider visits  Blood glucose logs reviewed today?: yes                     Diabetes Self Support Plan         Assessment Summary and Plan    Based on today's diabetes care assessment, the following areas of need were identified:      Social 7/5/2022   Support No   Access to Mass Media/Tech No   Cognitive/Behavioral Health No   Culture/Uatsdin No   Communication No   Health Literacy No        Clinical 7/5/2022   Medication Adherence No   Lab Compliance No   Nutritional Status Yes        Diabetes Self-Management Skills  1/31/2023   Diabetes Disease Process/Treatment Options -   Nutrition/Healthy Eating -   Physical Activity/Exercise -   Medication Yes   Home Blood Glucose Monitoring -   Acute Complications -   Chronic Complications -   Psychosocial/Coping -          Today's interventions were provided through individual discussion, instruction, and written materials were provided.      Patient verbalized understanding of instruction and written materials.  Pt was able to return back demonstration of instructions today. Patient understood key points, needs reinforcement and further instruction.     Diabetes Self-Management Care Plan:    Today's Diabetes Self-Management Care Plan was developed with Kimi's input. Kimi has agreed to work toward the following goal(s) to improve his/her overall diabetes control.      Care Plan: Diabetes Management   Updates made since 1/1/2023 12:00 AM        Problem: Physical Activity and Exercise         Long-Range Goal: Patient agrees to increase physical activity to a goal of 7 times per week for 6 minutes.    Start Date: 7/5/2022   Recent Progress: On track   Priority: Medium   Barriers: Physical Limitations   Note:    States walks to mailbox 3 days a week but will add it every day or do her 3lb weights that she has at home. 1/31/2023 States she has not been exercising but will start again today and changed goal to 5-7 min 7 days per week, encouraged to do at home with father who also has diabetes. Desk moves handout given and ideas for adding exercise discussed.        Problem: Healthy Eating         Long-Range Goal: Add 1-2  non-starchy vegetables to meals daily    Start Date: 11/1/2022   Priority: High   Barriers: Lack of Motivation to Change   Note:    Meal planning discussed and meal ideas and photos shown.  Assisted with grocery list to have more non-starchy foods available.  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  1/31/2023 She loves vegetables and eats them but will continue to add more, discussed shahriar lettuce and how to keep it fresh longer to use for lettuce wraps with meals or as snacks.        Problem: Medications         Long-Range Goal: Patient Agrees to take Diabetes Medication(s) daily as prescribed.    Start Date: 1/31/2023   Priority: High   Barriers: Knowledge deficit   Note:    She was taking novolog twice a day but after meals.  Discussed timing of meals and medication to take before meals and how it effects glucose.  She will start taking before meals         Task: Reviewed with patient all current diabetes medications and provided basic review of the purpose, dosage, frequency, side effects, and storage of both oral and injectable diabetes medications. Completed 1/31/2023        Task: Discussed guidelines for preventing, detecting and treating hypoglycemia and hyperglycemia and reviewed the importance of meal and medication timing with diabetes mediations for prevention of hypoglycemia and maximum drug benefit. Completed 1/31/2023          Follow Up Plan     Follow up in about 3 months (around 4/30/2023) for review goals and skills .    Today's care plan and follow up schedule was discussed with patient.  Kimi verbalized understanding of the care plan, goals, and agrees to follow up plan.        The patient was encouraged to communicate with his/her health care provider/physician and care team regarding his/her condition(s) and treatment.  I provided the patient with my contact information today and encouraged to contact me via phone or [facility] Patient Portal as needed.     Length of Visit   Total Time: 30 Minutes Diabetes Care Specialist Progress Note  Author: Faustina Ram RN  Date: 1/31/2023    Program Intake  Reason for Diabetes Program Visit:: Intervention  Type of Intervention:: Individual  Individual: Education  Education: Self-Management Skill Review  Current diabetes risk level::  low  Permission to speak with others about care:: yes    Lab Results   Component Value Date    HGBA1C 7.6 (H) 11/01/2022       Clinical                                  Additional Social History                                    Diabetes Self-Management Skills Assessment                   Medications  Diabetes management routine:: oral medications, insulin (Lantus 8units Qhs, Novolog 33 units bid, glimepiride 4mg daily, janumet xr 50mg-1000mg bid)  Patient is able to identify current diabetes medications, dosages, and appropriate timing of medications.: no  Patient understands the purpose of the medications taken for diabetes.: yes  Patient reports problems or concerns with current medication regimen.: no  Medication Skills Assessment Completed:: Yes  Assessment indicates:: Knowledge deficit, Instruction Needed  Area of need?: Yes    Home Blood Glucose Monitoring  How often do you check your blood sugar?: Once a day  When do you check your blood sugar?: Before breakfast  When you check what is your typical blood sugar range? : 104-178  Blood glucose logs:: yes, encouraged to keep logs, encouraged to bring logs to provider visits  Blood glucose logs reviewed today?: yes                     Diabetes Self Support Plan         Assessment Summary and Plan    Based on today's diabetes care assessment, the following areas of need were identified:      Social 7/5/2022   Support No   Access to Mass Media/Tech No   Cognitive/Behavioral Health No   Culture/Lutheran No   Communication No   Health Literacy No        Clinical 7/5/2022   Medication Adherence No   Lab Compliance No   Nutritional Status Yes        Diabetes Self-Management Skills 1/31/2023   Diabetes Disease Process/Treatment Options -   Nutrition/Healthy Eating -   Physical Activity/Exercise -   Medication Yes   Home Blood Glucose Monitoring -   Acute Complications -   Chronic Complications -   Psychosocial/Coping -          Today's interventions were provided  through individual discussion, instruction, and written materials were provided.      Patient verbalized understanding of instruction and written materials.  Pt was able to return back demonstration of instructions today. Patient understood key points, needs reinforcement and further instruction.     Diabetes Self-Management Care Plan:    Today's Diabetes Self-Management Care Plan was developed with Kimi's input. Kimi has agreed to work toward the following goal(s) to improve his/her overall diabetes control.      Care Plan: Diabetes Management   Updates made since 1/1/2023 12:00 AM        Problem: Physical Activity and Exercise         Long-Range Goal: Patient agrees to increase physical activity to a goal of 7 times per week for 6 minutes.    Start Date: 7/5/2022   Recent Progress: On track   Priority: Medium   Barriers: Physical Limitations   Note:    States walks to mailbox 3 days a week but will add it every day or do her 3lb weights that she has at home. 1/31/2023 States she has not been exercising but will start again today and changed goal to 5-7 min 7 days per week, encouraged to do at home with father who also has diabetes. Desk moves handout given and ideas for adding exercise discussed.        Problem: Healthy Eating         Long-Range Goal: Add 1-2  non-starchy vegetables to meals daily    Start Date: 11/1/2022   Priority: High   Barriers: Lack of Motivation to Change   Note:    Meal planning discussed and meal ideas and photos shown.  Assisted with grocery list to have more non-starchy foods available.  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 1/31/2023 She loves vegetables and eats them but will continue to add more, discussed shahriar lettuce and how to keep it fresh longer to use for lettuce wraps with meals or as snacks.        Problem: Medications         Long-Range Goal: Patient Agrees to take Diabetes Medication(s) daily as  prescribed.    Start Date: 1/31/2023   Priority: High   Barriers: Knowledge deficit   Note:    She was taking novolog twice a day but after meals.  Discussed timing of meals and medication to take before meals and how it effects glucose.  She will start taking before meals         Task: Reviewed with patient all current diabetes medications and provided basic review of the purpose, dosage, frequency, side effects, and storage of both oral and injectable diabetes medications. Completed 1/31/2023        Task: Discussed guidelines for preventing, detecting and treating hypoglycemia and hyperglycemia and reviewed the importance of meal and medication timing with diabetes mediations for prevention of hypoglycemia and maximum drug benefit. Completed 1/31/2023                Follow Up Plan     Follow up in about 3 months (around 4/30/2023) for review goals and skills . Father at side for support.  They call for uber or  transportation to get to . She will call to make f/u appt. After next labs, which will be next week.  Plan on reviewing glucose log, exercise goals and possible discuss CGM.      Today's care plan and follow up schedule was discussed with patient.  Kimi verbalized understanding of the care plan, goals, and agrees to follow up plan.        The patient was encouraged to communicate with his/her health care provider/physician and care team regarding his/her condition(s) and treatment.  I provided the patient with my contact information today and encouraged to contact me via phone or [facility] Patient Portal as needed.     Length of Visit   Total Time: 30 Minutes

## 2023-01-31 NOTE — PROGRESS NOTES
Subjective:      Patient ID: Kimi Bell is a 37 y.o.  female.    Chief Complaint: Chronic Medical Management    NIDDMII: A1c 7.6 from 11/01/22. Patient states she checks her blood sugars BID. Patient states her blood sugars have been averaging 130-190s. Patient following with [facility] for her eye exams. Patient taking Novolog 33U BID, Janumet XR nightly, Amaryl daily, and Lantus 8U daily. She is following with Diabetes Education and has an appointment with them after this appointment.     HTN/HLD: /82. Patient taking Losartan, Metoprolol, and Lipitor.    Hypothyroidism: Patient states she used to be on thyroid medication, but this was discontinued due to stable thyroid numbers. TSH negative from 07/15/21.    Schizophrenia/Anxiety/Insomnia: Patient following with Psychiatry. Patient taking Abilify, Benztropine, and Effexor.    THEODORE: Patient following with Neurology. She states compliance with CPAP nightly.    Iron Deficiency Anemia: Patient states compliance with iron supplementation during her menstrual cycles.    Vitamin D Deficiency: Vitamin D level negative from 07/15/21.    Allergic Rhinitis: Patient requesting an anti-histamine to be prescribed.    Preventative Health: Medicare Wellness completed on 11/01/22.    Review of Systems   Constitutional:  Negative for activity change, fatigue and fever.   Eyes:  Negative for visual disturbance.   Respiratory:  Negative for apnea, cough and shortness of breath.    Cardiovascular:  Negative for chest pain and leg swelling.   Gastrointestinal:  Negative for abdominal pain, diarrhea and nausea.   Genitourinary:  Negative for dysuria and hematuria.   Musculoskeletal:  Negative for arthralgias, back pain and myalgias.   Skin:  Negative for rash and wound.   Allergic/Immunologic: Positive for environmental allergies.   Neurological:  Negative for dizziness, weakness, numbness and headaches.   Psychiatric/Behavioral:  Negative for  "behavioral problems and dysphoric mood.      Objective:   /82 (BP Location: Right arm, Patient Position: Sitting, BP Method: Large (Manual))   Pulse 106   Temp 98.1 °F (36.7 °C)   Resp 18   Ht 4' 11" (1.499 m)   Wt (!) 138.3 kg (305 lb)   SpO2 99%   BMI 61.60 kg/m²     Physical Exam  Vitals and nursing note reviewed.   Constitutional:       General: She is not in acute distress.     Appearance: Normal appearance. She is not ill-appearing or toxic-appearing.   HENT:      Head: Normocephalic and atraumatic.      Mouth/Throat:      Mouth: Mucous membranes are moist.      Pharynx: Oropharynx is clear. No oropharyngeal exudate or posterior oropharyngeal erythema.   Eyes:      Conjunctiva/sclera: Conjunctivae normal.   Cardiovascular:      Rate and Rhythm: Normal rate and regular rhythm.      Heart sounds: Normal heart sounds. No murmur heard.  Pulmonary:      Effort: Pulmonary effort is normal. No respiratory distress.      Breath sounds: Normal breath sounds.   Abdominal:      General: There is no distension.      Palpations: Abdomen is soft.      Tenderness: There is no abdominal tenderness.   Musculoskeletal:         General: No deformity.      Right lower leg: No edema.      Left lower leg: No edema.   Skin:     General: Skin is warm and dry.      Findings: No lesion or rash.   Neurological:      General: No focal deficit present.      Mental Status: She is alert. Mental status is at baseline.   Psychiatric:         Mood and Affect: Mood normal.         Behavior: Behavior normal.         Thought Content: Thought content normal.         Judgment: Judgment normal.     Assessment/Plan:   1. Type 2 diabetes mellitus treated with insulin  Assessment & Plan:  - A1c for routine monitoring.  - Patient following with Diabetes Education.  - Patient following with [facility] for eye exams.  - Diabetic foot exam performed.  - Continue Lantus 8U daily.  - Continue Novolog 33U BID.  - Continue Janumet XR " 50mg-1000mg BID and Amaryl 4mg daily.    Orders:  -     Hemoglobin A1C; Future; Expected date: 02/02/2023  -     glimepiride (AMARYL) 4 MG tablet; Take 1 tablet (4 mg total) by mouth once daily.  Dispense: 90 tablet; Refill: 3  -     blood sugar diagnostic Strp; To check blood sugar twice daily, to use with insurance preferred meter  Dispense: 200 each; Refill: 1    2. Primary hypertension  Assessment & Plan:  - BP well-controlled.  - Continue Losartan 50mg daily and Metoprolol 50mg BID.      3. Mixed hyperlipidemia  Assessment & Plan:  - Continue Lipitor 10mg daily.      4. Hypothyroidism, unspecified type  Assessment & Plan:  - Patient states she used to be on thyroid medication, but this was discontinued due to stable thyroid numbers.  - Continue to monitor.      5. Schizophrenia, unspecified type  Assessment & Plan:  - Stable.  - Patient following with Psychiatry.  - Abilify 30mg daily, Benztropine 1mg BID, and Effexor XR 140mg daily per Psychiatry.        6. Generalized anxiety disorder  Assessment & Plan:  - Refer to Schizophrenia plan.      7. Insomnia, unspecified type  Assessment & Plan:  - Refer to Schizophrenia plan.      8. Obstructive sleep apnea syndrome  Assessment & Plan:  - Stable.  - Patient following with Neurology.  - Patient states compliance with CPAP nightly.        9. Iron deficiency anemia, unspecified iron deficiency anemia type  Assessment & Plan:  - Continue iron supplementation.        10. Vitamin D deficiency  Assessment & Plan:  - Vitamin D level negative from 07/15/21.      Orders:  -     Vitamin D; Future; Expected date: 01/31/2023    11. Allergic rhinitis, unspecified seasonality, unspecified trigger  Assessment & Plan:  - Patient requesting something prescribed for her allergies.  - Zyrtec 10mg daily.    Orders:  -     cetirizine (ZYRTEC) 10 MG tablet; Take 1 tablet (10 mg total) by mouth once daily.  Dispense: 90 tablet; Refill: 3    12. Need for pneumococcal vaccination  -      Pneumococcal Conjugate Vaccine (20 Valent) (IM)       Follow up in about 3 months (around 5/3/2023) for Chronic Medical Management.

## 2023-03-14 ENCOUNTER — HOSPITAL ENCOUNTER (OUTPATIENT)
Dept: RADIOLOGY | Facility: HOSPITAL | Age: 38
Discharge: HOME | End: 2023-03-14
Attending: STUDENT IN AN ORGANIZED HEALTH CARE EDUCATION/TRAINING PROGRAM
Payer: MEDICARE

## 2023-03-14 ENCOUNTER — OFFICE VISIT (OUTPATIENT)
Dept: FAMILY MEDICINE | Facility: CLINIC | Age: 38
End: 2023-03-14
Payer: MEDICARE

## 2023-03-14 VITALS
OXYGEN SATURATION: 95 % | HEIGHT: 59 IN | DIASTOLIC BLOOD PRESSURE: 85 MMHG | SYSTOLIC BLOOD PRESSURE: 158 MMHG | RESPIRATION RATE: 22 BRPM | WEIGHT: 293 LBS | HEART RATE: 121 BPM | TEMPERATURE: 99 F | BODY MASS INDEX: 59.07 KG/M2

## 2023-03-14 DIAGNOSIS — R05.8 PRODUCTIVE COUGH: ICD-10-CM

## 2023-03-14 DIAGNOSIS — J40 BRONCHITIS: ICD-10-CM

## 2023-03-14 DIAGNOSIS — U07.1 COVID: ICD-10-CM

## 2023-03-14 DIAGNOSIS — J18.9 COMMUNITY ACQUIRED PNEUMONIA, UNSPECIFIED LATERALITY: Primary | ICD-10-CM

## 2023-03-14 DIAGNOSIS — R06.02 SHORTNESS OF BREATH: ICD-10-CM

## 2023-03-14 PROCEDURE — 71046 X-RAY EXAM CHEST 2 VIEWS: CPT | Mod: TC

## 2023-03-14 PROCEDURE — 96372 THER/PROPH/DIAG INJ SC/IM: CPT | Mod: CR,,, | Performed by: STUDENT IN AN ORGANIZED HEALTH CARE EDUCATION/TRAINING PROGRAM

## 2023-03-14 PROCEDURE — 96372 PR INJECTION,THERAP/PROPH/DIAG2ST, IM OR SUBCUT: ICD-10-PCS | Mod: CR,,, | Performed by: STUDENT IN AN ORGANIZED HEALTH CARE EDUCATION/TRAINING PROGRAM

## 2023-03-14 PROCEDURE — 99214 OFFICE O/P EST MOD 30 MIN: CPT | Mod: CR,25,, | Performed by: STUDENT IN AN ORGANIZED HEALTH CARE EDUCATION/TRAINING PROGRAM

## 2023-03-14 PROCEDURE — 99214 PR OFFICE/OUTPT VISIT, EST, LEVL IV, 30-39 MIN: ICD-10-PCS | Mod: CR,25,, | Performed by: STUDENT IN AN ORGANIZED HEALTH CARE EDUCATION/TRAINING PROGRAM

## 2023-03-14 RX ORDER — AMOXICILLIN AND CLAVULANATE POTASSIUM 875; 125 MG/1; MG/1
1 TABLET, FILM COATED ORAL EVERY 12 HOURS
Qty: 14 TABLET | Refills: 0 | Status: SHIPPED | OUTPATIENT
Start: 2023-03-14 | End: 2023-03-21

## 2023-03-14 RX ORDER — BETAMETHASONE SODIUM PHOSPHATE AND BETAMETHASONE ACETATE 3; 3 MG/ML; MG/ML
12 INJECTION, SUSPENSION INTRA-ARTICULAR; INTRALESIONAL; INTRAMUSCULAR; SOFT TISSUE
Status: COMPLETED | OUTPATIENT
Start: 2023-03-14 | End: 2023-03-14

## 2023-03-14 RX ORDER — FERROUS GLUCONATE 324(38)MG
324 TABLET ORAL
COMMUNITY

## 2023-03-14 RX ORDER — ASPIRIN 81 MG/1
81 TABLET ORAL DAILY
COMMUNITY

## 2023-03-14 RX ORDER — AZITHROMYCIN 250 MG/1
TABLET, FILM COATED ORAL
Qty: 6 TABLET | Refills: 0 | Status: SHIPPED | OUTPATIENT
Start: 2023-03-14 | End: 2023-03-19

## 2023-03-14 RX ORDER — ERGOCALCIFEROL 1.25 MG/1
50000 CAPSULE ORAL
COMMUNITY
End: 2023-05-09

## 2023-03-14 RX ADMIN — BETAMETHASONE SODIUM PHOSPHATE AND BETAMETHASONE ACETATE 12 MG: 3; 3 INJECTION, SUSPENSION INTRA-ARTICULAR; INTRALESIONAL; INTRAMUSCULAR; SOFT TISSUE at 11:03

## 2023-03-14 NOTE — PROGRESS NOTES
"Subjective:      Patient ID: Kimi Bell is a 37 y.o.  female.    Chief Complaint: Sick Visit    Sick Visit: Onset x 1 week. Associated symptoms include decreased appetite, chills, productive cough, shortness of breath, dizziness, weakness, and diarrhea. She denies any N/V, dysuria, hematuria, hematochezia, or melena. She has been taking Excedrin and Robitussin OTC.    Review of Systems   Constitutional:  Positive for chills and fatigue.   HENT:  Negative for ear pain.    Respiratory:  Positive for cough (productive) and shortness of breath.    Cardiovascular:  Negative for chest pain, palpitations and leg swelling.   Gastrointestinal:  Positive for abdominal pain and diarrhea. Negative for blood in stool, nausea and vomiting.   Genitourinary:  Negative for dysuria and hematuria.   Musculoskeletal:  Negative for joint swelling and myalgias.   Skin:  Negative for rash and wound.   Neurological:  Positive for dizziness and weakness. Negative for syncope and light-headedness.     Objective:   BP (!) 158/85 (BP Location: Left arm, Patient Position: Sitting, BP Method: Large (Automatic))   Pulse (!) 121   Temp 98.7 °F (37.1 °C) (Oral)   Resp (!) 22   Ht 4' 11" (1.499 m)   Wt 135.9 kg (299 lb 11.2 oz)   LMP 03/11/2023   SpO2 95%   BMI 60.53 kg/m²     Physical Exam  Constitutional:       General: She is not in acute distress.     Appearance: She is obese. She is not toxic-appearing or diaphoretic.   HENT:      Head: Normocephalic and atraumatic.      Right Ear: There is impacted cerumen.      Left Ear: There is impacted cerumen.      Nose:      Comments: Hyperemic nares bilaterally.     Mouth/Throat:      Mouth: Mucous membranes are moist.      Pharynx: Oropharynx is clear. No oropharyngeal exudate or posterior oropharyngeal erythema.      Comments: Mucosal secretions at posterior oropharynx.  Eyes:      Conjunctiva/sclera: Conjunctivae normal.   Cardiovascular:      Rate and Rhythm: " Tachycardia present.      Heart sounds: No murmur heard.  Pulmonary:      Effort: Pulmonary effort is normal. No respiratory distress.      Breath sounds: Normal breath sounds. No stridor. No wheezing, rhonchi or rales.   Chest:      Chest wall: No tenderness.   Abdominal:      General: There is no distension.      Palpations: Abdomen is soft.      Tenderness: There is no abdominal tenderness. There is no right CVA tenderness, left CVA tenderness, guarding or rebound.   Musculoskeletal:         General: No swelling or tenderness. Normal range of motion.      Cervical back: Neck supple. No rigidity or tenderness.      Right lower leg: No edema.      Left lower leg: No edema.   Lymphadenopathy:      Cervical: No cervical adenopathy.   Skin:     General: Skin is warm and dry.      Findings: No lesion or rash.   Neurological:      General: No focal deficit present.      Mental Status: She is alert. Mental status is at baseline.      Motor: No weakness.      Coordination: Coordination normal.   Psychiatric:         Mood and Affect: Mood normal.         Behavior: Behavior normal.         Thought Content: Thought content normal.         Judgment: Judgment normal.     Assessment/Plan:   1. Bronchitis  Comments:  - Continue symptomatic treatment with OTC remedies.  - Patient educated regarding red flag symptoms to present to the ER for further evaluation.  Orders:  -     betamethasone acetate-betamethasone sodium phosphate injection 12 mg  -     azithromycin (Z-LEXX) 250 MG tablet; Take 2 tablets by mouth on day 1; Take 1 tablet by mouth on days 2-5  Dispense: 6 tablet; Refill: 0    2. Shortness of breath  -     COVID/FLU A&B PCR; Future; Expected date: 03/14/2023  -     X-Ray Chest PA And Lateral; Future; Expected date: 03/14/2023    3. Productive cough  -     COVID/FLU A&B PCR; Future; Expected date: 03/14/2023  -     X-Ray Chest PA And Lateral; Future; Expected date: 03/14/2023

## 2023-03-16 ENCOUNTER — TELEPHONE (OUTPATIENT)
Dept: FAMILY MEDICINE | Facility: CLINIC | Age: 38
End: 2023-03-16
Payer: MEDICARE

## 2023-03-16 NOTE — TELEPHONE ENCOUNTER
Can we verify how much Lantus patient is administering nightly? Whichever amount she is giving herself, would recommend increasing by 2U nightly and contact the clinic next week with fasting blood sugar readings.

## 2023-03-16 NOTE — TELEPHONE ENCOUNTER
Pt on 33 units of the insulin. I advised her to increase to 35 units and to call us next week with her blood sugar readings. Verbal understanding given

## 2023-03-16 NOTE — TELEPHONE ENCOUNTER
----- Message from Tila Sheriff sent at 3/16/2023 12:11 PM CDT -----  Regarding: med advice  .Type:  Needs Medical Advice    Who Called: Pt  Symptoms (please be specific):    How long has patient had these symptoms:    Pharmacy name and phone #:    Would the patient rather a call back or a response via [facility]? Call back  Best Call Back Number: [phone]  Additional Information:   Blood sugar readings:  3/15: 226  3/16: 167

## 2023-03-27 ENCOUNTER — PATIENT OUTREACH (OUTPATIENT)
Dept: ADMINISTRATIVE | Facility: HOSPITAL | Age: 38
End: 2023-03-27
Payer: MEDICARE

## 2023-03-27 NOTE — LETTER
AUTHORIZATION FOR RELEASE OF   CONFIDENTIAL INFORMATION    Dear [facility],    We are seeing Kimi Bell, date of birth 1985, in the clinic at [facility]. SHIRA CUNNINGHAM DO is the patient's PCP. Kimi Bell has an outstanding lab/procedure at the time we reviewed her chart. In order to help keep her health information updated, she has authorized us to request the following medical record(s):        (  )  MAMMOGRAM                                      (  )  COLONOSCOPY      (  )  PAP SMEAR                                          (  )  OUTSIDE LAB RESULTS     (  )  DEXA SCAN                                          ( X )  MOST RECENT DM EYE EXAM            (  )  FOOT EXAM                                          (  )  ENTIRE RECORD     (  )  OUTSIDE IMMUNIZATIONS                 (  )  _______________         Please fax records to [facility], MIMI NGUYEN, DO, [phone]     If you have any questions you can contact Breanna Che at [phone].         Patient Name: Kimi Bell  : 1985  Patient Phone #: [phone]

## 2023-03-27 NOTE — PROGRESS NOTES
Population Health. [facility]. Reviewing patient's chart for quality metrics. Records request sent to [facility]. Patient refuses pap smear.

## 2023-05-01 DIAGNOSIS — E78.2 MIXED HYPERLIPIDEMIA: ICD-10-CM

## 2023-05-01 DIAGNOSIS — E11.9 TYPE 2 DIABETES MELLITUS TREATED WITH INSULIN: ICD-10-CM

## 2023-05-01 DIAGNOSIS — Z79.4 TYPE 2 DIABETES MELLITUS TREATED WITH INSULIN: ICD-10-CM

## 2023-05-01 RX ORDER — ATORVASTATIN CALCIUM 20 MG/1
TABLET, FILM COATED ORAL
Qty: 90 TABLET | Refills: 0 | Status: SHIPPED | OUTPATIENT
Start: 2023-05-01 | End: 2024-02-16

## 2023-05-02 ENCOUNTER — LAB VISIT (OUTPATIENT)
Dept: LAB | Facility: HOSPITAL | Age: 38
End: 2023-05-02
Attending: STUDENT IN AN ORGANIZED HEALTH CARE EDUCATION/TRAINING PROGRAM
Payer: MEDICARE

## 2023-05-02 DIAGNOSIS — E11.9 TYPE 2 DIABETES MELLITUS TREATED WITH INSULIN: ICD-10-CM

## 2023-05-02 DIAGNOSIS — Z79.4 TYPE 2 DIABETES MELLITUS TREATED WITH INSULIN: ICD-10-CM

## 2023-05-02 DIAGNOSIS — I10 PRIMARY HYPERTENSION: ICD-10-CM

## 2023-05-02 DIAGNOSIS — Z79.4 TYPE 2 DIABETES MELLITUS TREATED WITH INSULIN: Primary | ICD-10-CM

## 2023-05-02 DIAGNOSIS — E11.9 TYPE 2 DIABETES MELLITUS TREATED WITH INSULIN: Primary | ICD-10-CM

## 2023-05-02 LAB
ALBUMIN SERPL-MCNC: 4 G/DL (ref 3.5–5)
ALBUMIN/GLOB SERPL: 0.8 RATIO (ref 1.1–2)
ALP SERPL-CCNC: 94 UNIT/L (ref 40–150)
ALT SERPL-CCNC: 74 UNIT/L (ref 0–55)
AST SERPL-CCNC: 88 UNIT/L (ref 5–34)
BILIRUBIN DIRECT+TOT PNL SERPL-MCNC: 1.2 MG/DL
BUN SERPL-MCNC: 12.3 MG/DL (ref 7–18.7)
CALCIUM SERPL-MCNC: 9.5 MG/DL (ref 8.4–10.2)
CHLORIDE SERPL-SCNC: 99 MMOL/L (ref 98–107)
CO2 SERPL-SCNC: 23 MMOL/L (ref 22–29)
CREAT SERPL-MCNC: 0.9 MG/DL (ref 0.55–1.02)
GFR SERPLBLD CREATININE-BSD FMLA CKD-EPI: >60 MLS/MIN/1.73/M2
GLOBULIN SER-MCNC: 4.9 GM/DL (ref 2.4–3.5)
GLUCOSE SERPL-MCNC: 459 MG/DL (ref 74–100)
POTASSIUM SERPL-SCNC: 3.8 MMOL/L (ref 3.5–5.1)
PROT SERPL-MCNC: 8.9 GM/DL (ref 6.4–8.3)
SODIUM SERPL-SCNC: 135 MMOL/L (ref 136–145)

## 2023-05-02 PROCEDURE — 36415 COLL VENOUS BLD VENIPUNCTURE: CPT

## 2023-05-02 PROCEDURE — 80053 COMPREHEN METABOLIC PANEL: CPT

## 2023-05-03 ENCOUNTER — DOCUMENTATION ONLY (OUTPATIENT)
Dept: FAMILY MEDICINE | Facility: CLINIC | Age: 38
End: 2023-05-03
Payer: MEDICARE

## 2023-05-03 NOTE — PROGRESS NOTES
Contacted patient to see how her blood sugars were doing.    Patient checked her glucose yesterday at 5PM, and it was 388. Patient gave herself 35U of the Novolog. She checked her glucose a few hours later at 9PM, and it was 165. She also gave herself 8U of Lantus before bedtime. She checked her fasting blood glucose this morning and it was 201. Patient instructed to increase Lantus to 10U nightly and continue Novolog 35U BID. She has a follow-up with me on 05/09/23. She was instructed to contact the clinic prior to her appointment if she had any issues with her blood sugars.

## 2023-05-09 ENCOUNTER — CLINICAL SUPPORT (OUTPATIENT)
Dept: DIABETES | Facility: CLINIC | Age: 38
End: 2023-05-09
Payer: MEDICARE

## 2023-05-09 ENCOUNTER — OFFICE VISIT (OUTPATIENT)
Dept: FAMILY MEDICINE | Facility: CLINIC | Age: 38
End: 2023-05-09
Payer: MEDICARE

## 2023-05-09 VITALS — WEIGHT: 293 LBS | BODY MASS INDEX: 59.07 KG/M2 | HEIGHT: 59 IN

## 2023-05-09 VITALS
TEMPERATURE: 98 F | BODY MASS INDEX: 59.07 KG/M2 | RESPIRATION RATE: 20 BRPM | HEART RATE: 78 BPM | HEIGHT: 59 IN | DIASTOLIC BLOOD PRESSURE: 82 MMHG | SYSTOLIC BLOOD PRESSURE: 120 MMHG | WEIGHT: 293 LBS | OXYGEN SATURATION: 99 %

## 2023-05-09 DIAGNOSIS — J30.9 ALLERGIC RHINITIS, UNSPECIFIED SEASONALITY, UNSPECIFIED TRIGGER: ICD-10-CM

## 2023-05-09 DIAGNOSIS — E11.9 TYPE 2 DIABETES MELLITUS TREATED WITH INSULIN: ICD-10-CM

## 2023-05-09 DIAGNOSIS — D50.9 IRON DEFICIENCY ANEMIA, UNSPECIFIED IRON DEFICIENCY ANEMIA TYPE: ICD-10-CM

## 2023-05-09 DIAGNOSIS — E11.65 TYPE 2 DIABETES MELLITUS WITH HYPERGLYCEMIA, WITH LONG-TERM CURRENT USE OF INSULIN: Primary | ICD-10-CM

## 2023-05-09 DIAGNOSIS — E78.2 MIXED HYPERLIPIDEMIA: ICD-10-CM

## 2023-05-09 DIAGNOSIS — G47.33 OBSTRUCTIVE SLEEP APNEA SYNDROME: ICD-10-CM

## 2023-05-09 DIAGNOSIS — F20.9 SCHIZOPHRENIA, UNSPECIFIED TYPE: ICD-10-CM

## 2023-05-09 DIAGNOSIS — Z79.4 TYPE 2 DIABETES MELLITUS WITH HYPERGLYCEMIA, WITH LONG-TERM CURRENT USE OF INSULIN: Primary | ICD-10-CM

## 2023-05-09 DIAGNOSIS — E55.9 VITAMIN D DEFICIENCY: ICD-10-CM

## 2023-05-09 DIAGNOSIS — F41.1 GENERALIZED ANXIETY DISORDER: ICD-10-CM

## 2023-05-09 DIAGNOSIS — E66.01 MORBID (SEVERE) OBESITY DUE TO EXCESS CALORIES: ICD-10-CM

## 2023-05-09 DIAGNOSIS — I10 PRIMARY HYPERTENSION: ICD-10-CM

## 2023-05-09 DIAGNOSIS — Z79.4 TYPE 2 DIABETES MELLITUS TREATED WITH INSULIN: ICD-10-CM

## 2023-05-09 DIAGNOSIS — G47.00 INSOMNIA, UNSPECIFIED TYPE: ICD-10-CM

## 2023-05-09 DIAGNOSIS — E03.9 HYPOTHYROIDISM, UNSPECIFIED TYPE: ICD-10-CM

## 2023-05-09 PROBLEM — E11.8 TYPE 2 DIABETES MELLITUS WITH UNSPECIFIED COMPLICATIONS: Status: ACTIVE | Noted: 2023-05-09

## 2023-05-09 PROCEDURE — G0108 DIAB MANAGE TRN  PER INDIV: HCPCS | Mod: ,,, | Performed by: INTERNAL MEDICINE

## 2023-05-09 PROCEDURE — 99214 OFFICE O/P EST MOD 30 MIN: CPT | Mod: ,,, | Performed by: STUDENT IN AN ORGANIZED HEALTH CARE EDUCATION/TRAINING PROGRAM

## 2023-05-09 PROCEDURE — G0108 PR DIAB MANAGE TRN  PER INDIV: ICD-10-PCS | Mod: ,,, | Performed by: INTERNAL MEDICINE

## 2023-05-09 PROCEDURE — 99214 PR OFFICE/OUTPT VISIT, EST, LEVL IV, 30-39 MIN: ICD-10-PCS | Mod: ,,, | Performed by: STUDENT IN AN ORGANIZED HEALTH CARE EDUCATION/TRAINING PROGRAM

## 2023-05-09 RX ORDER — CETIRIZINE HYDROCHLORIDE 10 MG/1
10 TABLET ORAL DAILY
Qty: 90 TABLET | Refills: 3 | Status: SHIPPED | OUTPATIENT
Start: 2023-05-09 | End: 2024-05-08

## 2023-05-09 RX ORDER — GLIMEPIRIDE 4 MG/1
4 TABLET ORAL DAILY
Qty: 90 TABLET | Refills: 3 | Status: SHIPPED | OUTPATIENT
Start: 2023-05-09

## 2023-05-09 RX ORDER — LANCETS 33 GAUGE
EACH MISCELLANEOUS
COMMUNITY
Start: 2023-01-31

## 2023-05-09 RX ORDER — INSULIN ASPART 100 [IU]/ML
35 INJECTION, SOLUTION INTRAVENOUS; SUBCUTANEOUS 2 TIMES DAILY WITH MEALS
Qty: 63 ML | Refills: 3
Start: 2023-05-09 | End: 2024-01-02 | Stop reason: SDUPTHER

## 2023-05-09 RX ORDER — LANCETS 30 GAUGE
EACH MISCELLANEOUS
COMMUNITY
Start: 2023-01-31

## 2023-05-09 NOTE — PATIENT INSTRUCTIONS
Take 12U of Lantus nightly. Contact the clinic next week with blood sugar readings.    Take 600-800IU of vitamin D daily. Will repeat vitamin D level in the future to monitor this.

## 2023-05-09 NOTE — ASSESSMENT & PLAN NOTE
- A1c 10.5 from 03/14/23.  - Patient following with Diabetes Education.  - Patient following with [facility] for eye exams.  - Diabetic foot exam UTD.  - Continue Lantus 12U daily.  - Continue Novolog 35U BID.  - Continue Janumet XR 50mg-1000mg BID and Amaryl 4mg daily.

## 2023-05-09 NOTE — ASSESSMENT & PLAN NOTE
- Vitamin D level 27.4, low from 03/14/23. Patient says she's not started vitamin D supplementation yet.  - Recommend vitamin D supplementation daily OTC.

## 2023-05-09 NOTE — PROGRESS NOTES
Subjective:      Patient ID: Kimi Bell is a 37 y.o.  female.    Chief Complaint: Chronic Medical Management    NIDDMII: A1c 10.5 from 03/14/23. Patient checks her blood sugars BID. Patient brought in blood glucose log and fasting sugars ranging from 180-388. She checks her blood sugars again about 2 hours after dinner where it's been ranging 165-232. Patient following with [facility] for her eye exams. Patient taking Novolog 35U BID, Janumet XR nightly, Amaryl daily, and Lantus 12U nightly. She is following with Diabetes Education and has an appointment with them after this appointment.     HTN/HLD: /82. Patient taking Losartan, Metoprolol, and Lipitor.    Hypothyroidism: Patient states she used to be on thyroid medication, but this was discontinued due to stable thyroid numbers. TSH negative from 11/01/22.    Schizophrenia/Anxiety/Insomnia: Patient following with Psychiatry. Patient taking Abilify, Benztropine, and Effexor.    THEODORE: Patient following with Neurology. She states compliance with CPAP nightly.    Iron Deficiency Anemia: Patient states compliance with iron supplementation during her menstrual cycles.    Vitamin D Deficiency: Vitamin D level 27.4, low from 03/14/23. Patient says she's not started vitamin D supplementation yet.    Allergic Rhinitis: Symptoms better on Zyrtec.     Elevated AST/ALT: Patient denies any alcohol or Tylenol use. Patient would like to continue to work on weight loss for now before further labs/imaging are pursued.    Preventative Health: Medicare Wellness completed on 11/01/22. Patient not amenable to Pap smears at this time.    Review of Systems   Constitutional:  Negative for activity change, fatigue and fever.   Eyes:  Negative for visual disturbance.   Respiratory:  Negative for apnea, cough and shortness of breath.    Cardiovascular:  Negative for chest pain and leg swelling.   Gastrointestinal:  Negative for abdominal pain,  "constipation, diarrhea, nausea and vomiting.   Genitourinary:  Negative for dysuria and hematuria.   Musculoskeletal:  Negative for arthralgias, back pain and myalgias.   Skin:  Negative for rash and wound.   Allergic/Immunologic: Positive for environmental allergies.   Neurological:  Negative for dizziness, weakness, numbness and headaches.   Psychiatric/Behavioral:  Negative for behavioral problems and dysphoric mood.      Objective:   /82 (BP Location: Right arm, Patient Position: Sitting, BP Method: Large (Manual))   Pulse 78   Temp 98.1 °F (36.7 °C) (Oral)   Resp 20   Ht 4' 11" (1.499 m)   Wt (!) 136.9 kg (301 lb 11.2 oz)   LMP 04/28/2023   SpO2 99%   BMI 60.94 kg/m²     Physical Exam  Vitals and nursing note reviewed.   Constitutional:       General: She is not in acute distress.     Appearance: Normal appearance. She is obese. She is not ill-appearing or toxic-appearing.   HENT:      Head: Normocephalic and atraumatic.      Mouth/Throat:      Mouth: Mucous membranes are moist.      Pharynx: Oropharynx is clear. No oropharyngeal exudate or posterior oropharyngeal erythema.   Eyes:      Conjunctiva/sclera: Conjunctivae normal.   Cardiovascular:      Rate and Rhythm: Normal rate and regular rhythm.      Heart sounds: Normal heart sounds. No murmur heard.  Pulmonary:      Effort: Pulmonary effort is normal. No respiratory distress.      Breath sounds: Normal breath sounds.   Abdominal:      General: There is no distension.      Palpations: Abdomen is soft.      Tenderness: There is no abdominal tenderness.   Musculoskeletal:         General: No deformity.      Right lower leg: No edema.      Left lower leg: No edema.   Skin:     General: Skin is warm and dry.      Findings: No lesion or rash.   Neurological:      General: No focal deficit present.      Mental Status: She is alert. Mental status is at baseline.   Psychiatric:         Mood and Affect: Mood normal.         Behavior: Behavior normal.    "      Thought Content: Thought content normal.         Judgment: Judgment normal.     Assessment/Plan:   1. Type 2 diabetes mellitus with hyperglycemia, with long-term current use of insulin  Assessment & Plan:  - A1c 10.5 from 03/14/23.  - Patient following with Diabetes Education.  - Patient following with [facility] for eye exams.  - Diabetic foot exam UTD.  - Continue Lantus 12U daily.  - Continue Novolog 35U BID.  - Continue Janumet XR 50mg-1000mg BID and Amaryl 4mg daily.    Orders:  -     SITagliptan-metformin (JANUMET) 50-1,000 mg per tablet; Take 1 tablet by mouth 2 (two) times daily with meals.  Dispense: 180 tablet; Refill: 3  -     glimepiride (AMARYL) 4 MG tablet; Take 1 tablet (4 mg total) by mouth once daily.  Dispense: 90 tablet; Refill: 3  -     Hemoglobin A1C; Future; Expected date: 06/09/2023    2. Type 2 diabetes mellitus treated with insulin  -     insulin aspart U-100 (NOVOLOG FLEXPEN U-100 INSULIN) 100 unit/mL (3 mL) InPn pen; Inject 35 Units into the skin 2 (two) times daily with meals.  Dispense: 63 mL; Refill: 3    3. Primary hypertension  Assessment & Plan:  - BP well-controlled.  - Continue Losartan 50mg daily and Metoprolol 50mg BID.      4. Mixed hyperlipidemia  Assessment & Plan:  - Continue Lipitor 20mg daily.      5. Hypothyroidism, unspecified type  Assessment & Plan:  - TSH negative from 11/01/22.  - Patient states she used to be on thyroid medication, but this was discontinued due to stable thyroid numbers.  - Continue to monitor.      6. Schizophrenia, unspecified type  Assessment & Plan:  - Stable.  - Patient following with Psychiatry.  - Abilify 30mg daily, Benztropine 1mg BID, and Effexor XR 140mg daily per Psychiatry.        7. Generalized anxiety disorder  Assessment & Plan:  - Refer to Schizophrenia plan.      8. Insomnia, unspecified type  Assessment & Plan:  - Refer to Schizophrenia plan.      9. Obstructive sleep apnea syndrome  Assessment & Plan:  - Stable.  -  Patient following with Neurology.  - Patient states compliance with CPAP nightly.        10. Iron deficiency anemia, unspecified iron deficiency anemia type  Assessment & Plan:  - Continue iron supplementation.        11. Vitamin D deficiency  Assessment & Plan:  - Vitamin D level 27.4, low from 03/14/23. Patient says she's not started vitamin D supplementation yet.  - Recommend vitamin D supplementation daily OTC.        12. Allergic rhinitis, unspecified seasonality, unspecified trigger  Assessment & Plan:  - Improved.  - Continue Zyrtec 10mg daily.    Orders:  -     cetirizine (ZYRTEC) 10 MG tablet; Take 1 tablet (10 mg total) by mouth once daily.  Dispense: 90 tablet; Refill: 3    13. Morbid (severe) obesity due to excess calories  Assessment & Plan:  - BMI 60  - Patient counseled regarding lifestyle modifications with diet and exercise.           Follow up in about 5 weeks (around 6/15/2023) for IDDMII.

## 2023-05-09 NOTE — PROGRESS NOTES
Diabetes Care Specialist Progress Note  Author: Faustina Ram RN  Date: 5/9/2023    Program Intake  Reason for Diabetes Program Visit:: Intervention  Type of Intervention:: Individual  Individual: Education  Education: Nutrition and Meal Planning  Current diabetes risk level:: high  In the last 12 months, have you:: none    Lab Results   Component Value Date    HGBA1C 10.5 (H) 03/14/2023       Clinical                                  Additional Social History                                    Diabetes Self-Management Skills Assessment                        Home Blood Glucose Monitoring  Patient states that blood sugar is checked at home daily.: yes  Monitoring Method:: home glucometer         Chronic Complications  Patient is aware that having diabetes increases risk of heart disease?: Yes  Patient able to state risk factors for heart disease?: Yes  Patient stated risk factors for heart disease:: Having diabetes, High blood pressure, High cholesterol           Diabetes Self Support Plan         Assessment Summary and Plan    Based on today's diabetes care assessment, the following areas of need were identified:      Social 7/5/2022   Support No   Access to Mass Media/Tech No   Cognitive/Behavioral Health No   Culture/Worship No   Communication No   Health Literacy No        Clinical 7/5/2022   Medication Adherence No   Lab Compliance No   Nutritional Status Yes        Diabetes Self-Management Skills 1/31/2023   Diabetes Disease Process/Treatment Options -   Nutrition/Healthy Eating Yes See care plan    Physical Activity/Exercise Yes See care plan    Medication Yes- Care plan Discussed Action, timing, s/e and benefits   Home Blood Glucose Monitoring    Acute Complications -   Chronic Complications A1C increased to 10.5, discussed chronic complications and need to decrease.  Encouraged she did it once so she can do it again    Psychosocial/Coping -          Today's interventions were provided through  individual discussion, instruction, and written materials were provided.      Patient verbalized understanding of instruction and written materials.  Pt was able to return back demonstration of instructions today. Patient understood key points, needs reinforcement and further instruction.     Diabetes Self-Management Care Plan:    Today's Diabetes Self-Management Care Plan was developed with Kimi's input. Kimi has agreed to work toward the following goal(s) to improve his/her overall diabetes control.      Care Plan: Diabetes Management   Updates made since 4/9/2023 12:00 AM        Problem: Physical Activity and Exercise         Long-Range Goal: Patient agrees to increase physical activity to a goal of 7 times per week for 6 minutes.    Start Date: 7/5/2022   Recent Progress: On track   Priority: Medium   Barriers: Physical Limitations   Note:    States walks to mailbox 3 days a week but will add it every day or do her 3lb weights that she has at home. 1/31/2023 States she has not been exercising but will start again today and changed goal to 5-7 min 7 days per week, encouraged to do at home with father who also has diabetes. Desk moves handout given and ideas for adding exercise discussed. 5/9/23 She has not been exercising regularly but does walk to mailbox. She will log exercise with glucose and work on goal of daily exercise of 6-10 min per day. Plan 5min walk and 5 min 3lb weights        Problem: Healthy Eating         Long-Range Goal: Add 1-2  non-starchy vegetables to meals daily Completed 5/9/2023   Start Date: 11/1/2022   This Visit's Progress: Met   Priority: High   Barriers: Lack of Motivation to Change   Note:    Meal planning discussed and meal ideas and photos shown.  Assisted with grocery list to have more non-starchy foods available.  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 1/31/2023 She loves vegetables and eats them but will  continue to add more, discussed shahriar lettuce and how to keep it fresh longer to use for lettuce wraps with meals or as snacks. 5/9/2023 We discussed meal planning and wrote grocery list and 5 examples of meals she can eat and likes. Gave skinnytaste.com website as resource for meal ideas and to modify according to likes and dislikes as well as modify for low salt and low carb. She states when she ate stir santana with no rice and walked on Treadmill for 10 min her glucose was 165.  She will continue to add veggies        Problem: Medications         Long-Range Goal: Patient Agrees to take Diabetes Medication(s) daily as prescribed. Completed 5/9/2023   Start Date: 1/31/2023   This Visit's Progress: Met   Priority: High   Barriers: Knowledge deficit   Note:    She was taking novolog twice a day but after meals.  Discussed timing of meals and medication to take before meals and how it effects glucose.  She will start taking before meals  5/9/23 She is taking medication daily at the correct time and dose. States medication increased to 12 units Lantus Qhs and 35units Novolog bid with meals, continue Gimepiride 4mg Qd and Janumet xr  bid.            Follow Up Plan     Follow up in about 4 weeks (around 6/6/2023) for Glucose monitoring and meal planning . Appt made with pt. Today for Marissa 15 at 11am per pt. Request for more meal planning and continue with exercise goal.      Today's care plan and follow up schedule was discussed with patient.  Kimi verbalized understanding of the care plan, goals, and agrees to follow up plan.        The patient was encouraged to communicate with his/her health care provider/physician and care team regarding his/her condition(s) and treatment.  I provided the patient with my contact information today and encouraged to contact me via phone or [facility] as needed.     Length of Visit   Total Time: 60 Minutes

## 2023-05-09 NOTE — ASSESSMENT & PLAN NOTE
- TSH negative from 11/01/22.  - Patient states she used to be on thyroid medication, but this was discontinued due to stable thyroid numbers.  - Continue to monitor.

## 2023-05-15 ENCOUNTER — TELEPHONE (OUTPATIENT)
Dept: FAMILY MEDICINE | Facility: CLINIC | Age: 38
End: 2023-05-15
Payer: MEDICARE

## 2023-05-15 NOTE — TELEPHONE ENCOUNTER
----- Message from Roscoe Avelar sent at 5/15/2023 12:47 PM CDT -----  Regarding: call back  .Type:  Needs Medical Advice    Who Called: [email or web address]  Would the patient rather a call back or a response via [facility]?   Best Call Back Number: [phone]  Additional Information: pt states her blood sugar readings are:  -05/10 233 am, 205 pm  -05/11 293 am, 171 pm  -05/12 225 am, 185 pm  -05/13 210 am -143 pm  -05/14 185 am - 200 pm  -05/15 251 am    Pls call back pt

## 2023-05-15 NOTE — TELEPHONE ENCOUNTER
Patient instructed to increase Lantus from 12U to 14U nightly. Continue Novolog 35U BID. Patient will contact the clinic Friday AM with blood sugar readings.

## 2023-05-19 ENCOUNTER — TELEPHONE (OUTPATIENT)
Dept: FAMILY MEDICINE | Facility: CLINIC | Age: 38
End: 2023-05-19
Payer: MEDICARE

## 2023-05-19 NOTE — TELEPHONE ENCOUNTER
----- Message from Tila Sheriff sent at 5/19/2023 10:26 AM CDT -----  Regarding: Sugar Readings  .Type:  Needs Medical Advice    Who Called: Pt  Symptoms (please be specific):    How long has patient had these symptoms:    Pharmacy name and phone #:    Would the patient rather a call back or a response via [facility]? Call back  Best Call Back Number: [phone]  Additional Information:                         Fasting    PM  5-15:    251       128  5-16:    237       160  5-17:    241       132  5-18:    248       181  5-19:   150

## 2023-05-19 NOTE — TELEPHONE ENCOUNTER
Called and spoke with patient. She will increase her Lantus from 14U to 16U nightly and contact the clinic next week with blood sugar readings.

## 2023-07-30 DIAGNOSIS — E11.9 TYPE 2 DIABETES MELLITUS TREATED WITH INSULIN: ICD-10-CM

## 2023-07-30 DIAGNOSIS — Z79.4 TYPE 2 DIABETES MELLITUS TREATED WITH INSULIN: ICD-10-CM

## 2023-09-28 ENCOUNTER — PATIENT OUTREACH (OUTPATIENT)
Dept: ADMINISTRATIVE | Facility: HOSPITAL | Age: 38
End: 2023-09-28
Payer: MEDICARE

## 2023-09-28 NOTE — PROGRESS NOTES
Population Health. Out [facility]. Reviewing patient's chart for quality metrics. Records request sent to [facility] office for DM eye exam.

## 2023-09-28 NOTE — LETTER
AUTHORIZATION FOR RELEASE OF   CONFIDENTIAL INFORMATION    Dear [facility], Fax [phone]    We are seeing Kimi Bell, date of birth 1985, in the clinic at [facility]. Karmen Kumar DO is the patient's PCP. Kimi Bell has an outstanding lab/procedure at the time we reviewed her chart. In order to help keep her health information updated, she has authorized us to request the following medical record(s):        (  )  MAMMOGRAM                                      (  )  COLONOSCOPY      (  )  PAP SMEAR                                          (  )  OUTSIDE LAB RESULTS     (  )  DEXA SCAN                                          ( X )  EYE EXAM            (  )  FOOT EXAM                                          (  )  ENTIRE RECORD     (  )  OUTSIDE IMMUNIZATIONS                 (  )  _______________         Please fax records to [facility], Nguyen, Mimi T, DO, [phone] or [phone].       If you have any questions you can contact Breanna Che at [phone].         Patient Name: Kimi Bell  : 1985  Patient Phone #: [phone]

## 2023-10-02 NOTE — PROGRESS NOTES
Per [facility] last DM eye exam was done on 6/7/21

## 2023-11-01 DIAGNOSIS — E11.9 TYPE 2 DIABETES MELLITUS TREATED WITH INSULIN: ICD-10-CM

## 2023-11-01 DIAGNOSIS — Z79.4 TYPE 2 DIABETES MELLITUS TREATED WITH INSULIN: ICD-10-CM

## 2023-11-02 RX ORDER — PEN NEEDLE, DIABETIC 31 GX5/16"
NEEDLE, DISPOSABLE MISCELLANEOUS
Qty: 100 EACH | Refills: 3 | Status: SHIPPED | OUTPATIENT
Start: 2023-11-02 | End: 2024-01-17 | Stop reason: SDUPTHER

## 2023-11-10 ENCOUNTER — DOCUMENTATION ONLY (OUTPATIENT)
Dept: ADMINISTRATIVE | Facility: HOSPITAL | Age: 38
End: 2023-11-10
Payer: MEDICARE

## 2024-01-02 ENCOUNTER — OFFICE VISIT (OUTPATIENT)
Dept: FAMILY MEDICINE | Facility: CLINIC | Age: 39
End: 2024-01-02
Payer: MEDICARE

## 2024-01-02 ENCOUNTER — LAB VISIT (OUTPATIENT)
Dept: LAB | Facility: HOSPITAL | Age: 39
End: 2024-01-02
Attending: STUDENT IN AN ORGANIZED HEALTH CARE EDUCATION/TRAINING PROGRAM
Payer: MEDICARE

## 2024-01-02 VITALS
TEMPERATURE: 98 F | BODY MASS INDEX: 59.07 KG/M2 | WEIGHT: 293 LBS | RESPIRATION RATE: 20 BRPM | HEART RATE: 102 BPM | HEIGHT: 59 IN | DIASTOLIC BLOOD PRESSURE: 88 MMHG | SYSTOLIC BLOOD PRESSURE: 136 MMHG | OXYGEN SATURATION: 98 %

## 2024-01-02 DIAGNOSIS — F41.1 GENERALIZED ANXIETY DISORDER: ICD-10-CM

## 2024-01-02 DIAGNOSIS — E55.9 VITAMIN D DEFICIENCY: ICD-10-CM

## 2024-01-02 DIAGNOSIS — E03.9 HYPOTHYROIDISM, UNSPECIFIED TYPE: Primary | ICD-10-CM

## 2024-01-02 DIAGNOSIS — E11.65 TYPE 2 DIABETES MELLITUS WITH HYPERGLYCEMIA, WITH LONG-TERM CURRENT USE OF INSULIN: ICD-10-CM

## 2024-01-02 DIAGNOSIS — E03.9 HYPOTHYROIDISM, UNSPECIFIED TYPE: ICD-10-CM

## 2024-01-02 DIAGNOSIS — J30.9 ALLERGIC RHINITIS, UNSPECIFIED SEASONALITY, UNSPECIFIED TRIGGER: ICD-10-CM

## 2024-01-02 DIAGNOSIS — E66.01 MORBID (SEVERE) OBESITY DUE TO EXCESS CALORIES: ICD-10-CM

## 2024-01-02 DIAGNOSIS — I10 PRIMARY HYPERTENSION: ICD-10-CM

## 2024-01-02 DIAGNOSIS — Z23 NEED FOR INFLUENZA VACCINATION: ICD-10-CM

## 2024-01-02 DIAGNOSIS — F20.9 SCHIZOPHRENIA, UNSPECIFIED TYPE: ICD-10-CM

## 2024-01-02 DIAGNOSIS — D50.9 IRON DEFICIENCY ANEMIA, UNSPECIFIED IRON DEFICIENCY ANEMIA TYPE: ICD-10-CM

## 2024-01-02 DIAGNOSIS — E78.2 MIXED HYPERLIPIDEMIA: ICD-10-CM

## 2024-01-02 DIAGNOSIS — E11.9 TYPE 2 DIABETES MELLITUS TREATED WITH INSULIN: ICD-10-CM

## 2024-01-02 DIAGNOSIS — Z79.4 TYPE 2 DIABETES MELLITUS WITH HYPERGLYCEMIA, WITH LONG-TERM CURRENT USE OF INSULIN: ICD-10-CM

## 2024-01-02 DIAGNOSIS — Z00.00 MEDICARE ANNUAL WELLNESS VISIT, SUBSEQUENT: Primary | ICD-10-CM

## 2024-01-02 DIAGNOSIS — Z00.00 MEDICARE ANNUAL WELLNESS VISIT, SUBSEQUENT: ICD-10-CM

## 2024-01-02 DIAGNOSIS — G47.00 INSOMNIA, UNSPECIFIED TYPE: ICD-10-CM

## 2024-01-02 DIAGNOSIS — Z79.4 TYPE 2 DIABETES MELLITUS TREATED WITH INSULIN: ICD-10-CM

## 2024-01-02 DIAGNOSIS — G47.33 OBSTRUCTIVE SLEEP APNEA SYNDROME: ICD-10-CM

## 2024-01-02 LAB
ALBUMIN SERPL-MCNC: 3.9 G/DL (ref 3.5–5)
ALBUMIN/GLOB SERPL: 0.8 RATIO (ref 1.1–2)
ALP SERPL-CCNC: 100 UNIT/L (ref 40–150)
ALT SERPL-CCNC: 105 UNIT/L (ref 0–55)
AST SERPL-CCNC: 96 UNIT/L (ref 5–34)
BILIRUB SERPL-MCNC: 1 MG/DL
BUN SERPL-MCNC: 9.2 MG/DL (ref 7–18.7)
CALCIUM SERPL-MCNC: 9 MG/DL (ref 8.4–10.2)
CHLORIDE SERPL-SCNC: 103 MMOL/L (ref 98–107)
CHOLEST SERPL-MCNC: 208 MG/DL
CHOLEST/HDLC SERPL: 5 {RATIO} (ref 0–5)
CO2 SERPL-SCNC: 23 MMOL/L (ref 22–29)
CREAT SERPL-MCNC: 0.89 MG/DL (ref 0.55–1.02)
CREAT UR-MCNC: 174.9 MG/DL (ref 45–106)
DEPRECATED CALCIDIOL+CALCIFEROL SERPL-MC: 30.4 NG/ML (ref 30–80)
ERYTHROCYTE [DISTWIDTH] IN BLOOD BY AUTOMATED COUNT: 12.4 % (ref 11.5–17)
EST. AVERAGE GLUCOSE BLD GHB EST-MCNC: 297.7 MG/DL
GFR SERPLBLD CREATININE-BSD FMLA CKD-EPI: >60 MLS/MIN/1.73/M2
GLOBULIN SER-MCNC: 4.6 GM/DL (ref 2.4–3.5)
GLUCOSE SERPL-MCNC: 264 MG/DL (ref 74–100)
HBA1C MFR BLD: 12 %
HCT VFR BLD AUTO: 40.4 % (ref 37–47)
HDLC SERPL-MCNC: 38 MG/DL (ref 35–60)
HGB BLD-MCNC: 13.2 G/DL (ref 12–16)
LDLC SERPL CALC-MCNC: 139 MG/DL (ref 50–140)
MCH RBC QN AUTO: 30.7 PG (ref 27–31)
MCHC RBC AUTO-ENTMCNC: 32.7 G/DL (ref 33–36)
MCV RBC AUTO: 94 FL (ref 80–94)
MICROALBUMIN UR-MCNC: 49.5 UG/ML
MICROALBUMIN/CREAT RATIO PNL UR: 28.3 MG/GM CR (ref 0–30)
NRBC BLD AUTO-RTO: 0 %
PLATELET # BLD AUTO: 352 X10(3)/MCL (ref 130–400)
PMV BLD AUTO: 9.1 FL (ref 7.4–10.4)
POTASSIUM SERPL-SCNC: 3.9 MMOL/L (ref 3.5–5.1)
PROT SERPL-MCNC: 8.5 GM/DL (ref 6.4–8.3)
RBC # BLD AUTO: 4.3 X10(6)/MCL (ref 4.2–5.4)
SODIUM SERPL-SCNC: 138 MMOL/L (ref 136–145)
T4 FREE SERPL-MCNC: 1.1 NG/DL (ref 0.7–1.48)
TRIGL SERPL-MCNC: 154 MG/DL (ref 37–140)
TSH SERPL-ACNC: 5.42 UIU/ML (ref 0.35–4.94)
VLDLC SERPL CALC-MCNC: 31 MG/DL
WBC # SPEC AUTO: 13.29 X10(3)/MCL (ref 4.5–11.5)

## 2024-01-02 PROCEDURE — 82043 UR ALBUMIN QUANTITATIVE: CPT

## 2024-01-02 PROCEDURE — 84439 ASSAY OF FREE THYROXINE: CPT

## 2024-01-02 PROCEDURE — 85027 COMPLETE CBC AUTOMATED: CPT

## 2024-01-02 PROCEDURE — 80061 LIPID PANEL: CPT

## 2024-01-02 PROCEDURE — 90686 IIV4 VACC NO PRSV 0.5 ML IM: CPT | Mod: ,,, | Performed by: STUDENT IN AN ORGANIZED HEALTH CARE EDUCATION/TRAINING PROGRAM

## 2024-01-02 PROCEDURE — G0439 PPPS, SUBSEQ VISIT: HCPCS | Mod: ,,, | Performed by: STUDENT IN AN ORGANIZED HEALTH CARE EDUCATION/TRAINING PROGRAM

## 2024-01-02 PROCEDURE — 84443 ASSAY THYROID STIM HORMONE: CPT

## 2024-01-02 PROCEDURE — 82306 VITAMIN D 25 HYDROXY: CPT

## 2024-01-02 PROCEDURE — 83036 HEMOGLOBIN GLYCOSYLATED A1C: CPT

## 2024-01-02 PROCEDURE — G0008 ADMIN INFLUENZA VIRUS VAC: HCPCS | Mod: ,,, | Performed by: STUDENT IN AN ORGANIZED HEALTH CARE EDUCATION/TRAINING PROGRAM

## 2024-01-02 PROCEDURE — 80053 COMPREHEN METABOLIC PANEL: CPT

## 2024-01-02 PROCEDURE — 36415 COLL VENOUS BLD VENIPUNCTURE: CPT

## 2024-01-02 RX ORDER — METOPROLOL TARTRATE 50 MG/1
50 TABLET ORAL 2 TIMES DAILY
Qty: 180 TABLET | Refills: 3 | Status: SHIPPED | OUTPATIENT
Start: 2024-01-02

## 2024-01-02 RX ORDER — INSULIN ASPART 100 [IU]/ML
35 INJECTION, SOLUTION INTRAVENOUS; SUBCUTANEOUS 2 TIMES DAILY WITH MEALS
Qty: 63 ML | Refills: 3
Start: 2024-01-02 | End: 2024-01-17 | Stop reason: SDUPTHER

## 2024-01-02 RX ORDER — LOSARTAN POTASSIUM 50 MG/1
50 TABLET ORAL DAILY
Qty: 90 TABLET | Refills: 3 | Status: SHIPPED | OUTPATIENT
Start: 2024-01-02

## 2024-01-02 NOTE — ASSESSMENT & PLAN NOTE
- A1c 10.5 from 03/14/23.  - Patient following with Diabetes Education.  - Patient following with [facility] for eye exams.  - Diabetic foot exam performed.  - Continue Lantus 16U nightly.  - Continue Novolog 35U BID.  - Continue Janumet XR 50mg-1000mg BID and Amaryl 4mg daily.

## 2024-01-02 NOTE — PROGRESS NOTES
Subjective:      Patient ID: Kimi Bell is a 38 y.o.  female.    Chief Complaint: Medicare Wellness    Preventative Health: Patient amenable to flu vaccine. Patient not amenable to Pap smear at this time.    NIDDMII: A1c 10.5 from 03/14/23. Patient checks her blood sugars BID, but hasn't been doing so due to grieving the loss of her mother in August. Patient following with [facility] for her eye exams. Patient taking Novolog 35U BID, Janumet XR nightly, Amaryl daily, and Lantus 16U nightly. She is inquiring about starting Ozempic. She is following with Diabetes Education.    HTN/HLD: /88. Patient taking Losartan, Metoprolol, and Lipitor.    Hypothyroidism: Patient states she used to be on thyroid medication, but this was discontinued due to stable thyroid numbers. TSH negative from 11/01/22.    Schizophrenia/Anxiety/Insomnia: Patient following with Psychiatry. Patient taking Abilify, Benztropine, and Effexor.    THEODORE: Patient following with Neurology. She states compliance with CPAP nightly.    Iron Deficiency Anemia: Patient states compliance with iron supplementation during her menstrual cycles.    Vitamin D Deficiency: Vitamin D level 27.4, low from 03/14/23. Patient says she's not started vitamin D supplementation yet.    Allergic Rhinitis: Patient taking Zyrtec and Flonase.     Elevated AST/ALT: Patient denies any alcohol or Tylenol use.     Review of Systems   Constitutional:  Negative for activity change, appetite change, chills, diaphoresis, fatigue, fever and unexpected weight change.   Eyes:  Negative for visual disturbance.   Respiratory:  Negative for apnea, cough, shortness of breath, wheezing and stridor.    Cardiovascular:  Negative for chest pain, palpitations and leg swelling.   Gastrointestinal:  Negative for abdominal pain, blood in stool, constipation, diarrhea, nausea and vomiting.   Genitourinary:  Negative for dysuria and hematuria.   Musculoskeletal:   "Negative for arthralgias, back pain, gait problem and myalgias.   Skin:  Negative for rash and wound.   Allergic/Immunologic: Positive for environmental allergies.   Neurological:  Negative for dizziness, syncope, weakness, numbness and headaches.   Psychiatric/Behavioral:  Positive for dysphoric mood. Negative for behavioral problems, self-injury, sleep disturbance and suicidal ideas. The patient is not nervous/anxious.      Objective:   /88 (BP Location: Right arm, Patient Position: Sitting)   Pulse 102   Temp 98 °F (36.7 °C)   Resp 20   Ht 4' 11" (1.499 m)   Wt (!) 140.6 kg (310 lb)   SpO2 98%   BMI 62.61 kg/m²     Physical Exam  Vitals and nursing note reviewed.   Constitutional:       General: She is not in acute distress.     Appearance: Normal appearance. She is obese. She is not ill-appearing or toxic-appearing.   HENT:      Head: Normocephalic and atraumatic.      Mouth/Throat:      Mouth: Mucous membranes are moist.      Pharynx: Oropharynx is clear. No oropharyngeal exudate or posterior oropharyngeal erythema.   Eyes:      Conjunctiva/sclera: Conjunctivae normal.   Cardiovascular:      Rate and Rhythm: Normal rate and regular rhythm.      Pulses:           Dorsalis pedis pulses are 3+ on the right side and 3+ on the left side.      Heart sounds: Normal heart sounds. No murmur heard.  Pulmonary:      Effort: Pulmonary effort is normal. No respiratory distress.      Breath sounds: Normal breath sounds.   Abdominal:      General: There is no distension.      Palpations: Abdomen is soft.      Tenderness: There is no abdominal tenderness.   Musculoskeletal:         General: No deformity.      Cervical back: Neck supple. No tenderness.      Right lower leg: No edema.      Left lower leg: No edema.      Right foot: No deformity.      Left foot: No deformity.   Feet:      Right foot:      Protective Sensation: 10 sites tested.  10 sites sensed.      Skin integrity: Skin integrity normal.      Toenail " Condition: Right toenails are normal.      Left foot:      Protective Sensation: 10 sites tested.  10 sites sensed.      Skin integrity: Skin integrity normal.      Toenail Condition: Left toenails are normal.   Lymphadenopathy:      Cervical: No cervical adenopathy.   Skin:     General: Skin is warm and dry.      Findings: No lesion or rash.   Neurological:      General: No focal deficit present.      Mental Status: She is alert. Mental status is at baseline.      Motor: No weakness.      Coordination: Coordination normal.   Psychiatric:         Mood and Affect: Mood normal.         Behavior: Behavior normal.         Thought Content: Thought content normal.         Judgment: Judgment normal.       Assessment/Plan:   1. Medicare annual wellness visit, subsequent  Comments:  - Health maintenance reviewed.  Orders:  -     Microalbumin/Creatinine Ratio, Urine; Future; Expected date: 01/02/2024    2. Need for influenza vaccination  -     Influenza - Quadrivalent *Preferred* (6 months+) (PF)    3. Morbid (severe) obesity due to excess calories  Assessment & Plan:  - BMI 62  - Patient counseled regarding lifestyle modifications with diet and exercise.        4. Type 2 diabetes mellitus with hyperglycemia, with long-term current use of insulin  Assessment & Plan:  - A1c 10.5 from 03/14/23.  - Patient following with Diabetes Education.  - Patient following with [facility] for eye exams.  - Diabetic foot exam performed.  - Continue Lantus 16U nightly.  - Continue Novolog 35U BID.  - Continue Janumet XR 50mg-1000mg BID and Amaryl 4mg daily.    Orders:  -     Microalbumin/Creatinine Ratio, Urine; Future; Expected date: 01/02/2024  -     Hemoglobin A1C; Future; Expected date: 01/02/2024  -     CBC Without Differential; Future; Expected date: 01/02/2024  -     Comprehensive Metabolic Panel; Future; Expected date: 01/02/2024    5. Type 2 diabetes mellitus treated with insulin  -     insulin aspart U-100 (NOVOLOG FLEXPEN  U-100 INSULIN) 100 unit/mL (3 mL) InPn pen; Inject 35 Units into the skin 2 (two) times daily with meals.  Dispense: 63 mL; Refill: 3    6. Primary hypertension  Assessment & Plan:  - BP well-controlled.  - Continue Losartan 50mg daily and Metoprolol 50mg BID.    Orders:  -     CBC Without Differential; Future; Expected date: 01/02/2024  -     Comprehensive Metabolic Panel; Future; Expected date: 01/02/2024  -     losartan (COZAAR) 50 MG tablet; Take 1 tablet (50 mg total) by mouth once daily.  Dispense: 90 tablet; Refill: 3  -     metoprolol tartrate (LOPRESSOR) 50 MG tablet; Take 1 tablet (50 mg total) by mouth 2 (two) times daily.  Dispense: 180 tablet; Refill: 3    7. Mixed hyperlipidemia  Assessment & Plan:  - Continue Lipitor 20mg daily.    Orders:  -     Lipid Panel; Future; Expected date: 01/02/2024  -     TSH; Future; Expected date: 01/02/2024    8. Hypothyroidism, unspecified type  Assessment & Plan:  - TSH negative from 11/01/22.  - Patient states she used to be on thyroid medication, but this was discontinued due to stable thyroid numbers.  - Continue to monitor.      9. Schizophrenia, unspecified type  Assessment & Plan:  - Stable.  - Patient following with Psychiatry.  - Abilify 30mg daily, Benztropine 1mg BID, and Effexor XR 140mg daily per Psychiatry.        10. Generalized anxiety disorder  Assessment & Plan:  - Refer to Schizophrenia plan.      11. Insomnia, unspecified type  Assessment & Plan:  - Refer to Schizophrenia plan.      12. Obstructive sleep apnea syndrome  Assessment & Plan:  - Stable.  - Patient following with Neurology.  - Patient states compliance with CPAP nightly.        13. Iron deficiency anemia, unspecified iron deficiency anemia type  Assessment & Plan:  - Continue iron supplementation.        14. Vitamin D deficiency  Assessment & Plan:  - Vitamin D level 27.4, low from 03/14/23. Patient says she's not started vitamin D supplementation yet.  - Recommend vitamin D  supplementation daily OTC.      Orders:  -     Vitamin D; Future; Expected date: 01/02/2024    15. Allergic rhinitis, unspecified seasonality, unspecified trigger  Assessment & Plan:  - Stable.  - Continue Zyrtec 10mg daily and Flonase OTC.           Opioid Screening: Patient medication list reviewed, patient is not taking prescription opioids. Patient is not using additional opioids than prescribed. Patient is at low risk of substance abuse based on this opioid use history.     Patient Reported Health Risk Assessment  Are you male or female?: Female  During the past four weeks, how much have you been bothered by emotional problems such as feeling anxious, depressed, irritable, sad, or downhearted and blue?: Not at all  During the past five weeks, has your physical and/or emotional health limited your social activities with family, friends, neighbors, or groups?: Not at all  During the past four weeks, how much bodily pain have you generally had?: Very mild pain  During the past four weeks, was someone available to help if you needed and wanted help?: Yes, as much as I wanted  During the past four weeks, what was the hardest physical activity you could do for at least two minutes?: Moderate  Can you get to places out of walking distance without help?  (For example, can you travel alone on buses or taxis, or drive your own car?): Yes  Can you go shopping for groceries or clothes without someone's help?: Yes  Can you prepare your own meals?: Yes  Can you do your own housework without help?: Yes  Because of any health problems, do you need the help of another person with your personal care needs such as eating, bathing, dressing, or getting around the house?: No  Can you handle your own money without help?: Yes  During the past four weeks, how would you rate your health in general?: Good  How have things been going for you during the past four weeks?: Pretty well  Are you having difficulties driving your car?: No  Do  you always fasten your seat belt when you are in a car?: Yes, usually  How often in the past four weeks have you been bothered by falling or dizzy when standing up?: Never  How often in the past four weeks have you been bothered by sexual problems?: Never  How often in the past four weeks have you been bothered by trouble eating well?: Seldom  How often in the past four weeks have you been bothered by teeth or denture problems?: Never  How often in the past four weeks have you been bothered with problems using the telephone?: Never  How often in the past four weeks have you been bothered by tiredness or fatigue?: Seldom  Have you fallen two or more times in the past year?: No  Are you afraid of falling?: No  Are you a smoker?: No  During the past four weeks, how many drinks of wine, beer, or other alcoholic beverages did you have?: No alcohol at all  Do you exercise for about 20 minutes three or more days a week?: Yes, most of the time  Have you been given any information to help you with hazards in your house that might hurt you?: Yes  Have you been given any information to help you with keeping track of your medications?: Yes  How often do you have trouble taking medicines the way you've been told to take them?: I always take them as prescribed  How confident are you that you can control and manage most of your health problems?: Very confident  What is your race? (Check all that apply.):     Medicare Annual Wellness and Personalized Prevention Plan:   Fall Risk + Home Safety + Hearing Impairment + Depression Screen + Opioid and Substance Abuse Screening + Cognitive Impairment Screen + Health Risk Assessment all reviewed.     Advance Care Planning   I attest to discussing Advance Care Planning with patient and/or family member.  Education was provided including the importance of the Health Care Power of , Advance Directives, and/or [place] documentation.  The patient expressed understanding to the  importance of this information and discussion.       Follow up in about 3 months (around 4/2/2024) for Chronic Medical Management.

## 2024-01-05 ENCOUNTER — TELEPHONE (OUTPATIENT)
Dept: FAMILY MEDICINE | Facility: CLINIC | Age: 39
End: 2024-01-05
Payer: MEDICARE

## 2024-01-05 DIAGNOSIS — R74.8 ELEVATED LIVER ENZYMES: Primary | ICD-10-CM

## 2024-01-05 DIAGNOSIS — E11.9 TYPE 2 DIABETES MELLITUS TREATED WITH INSULIN: Primary | ICD-10-CM

## 2024-01-05 DIAGNOSIS — Z79.4 TYPE 2 DIABETES MELLITUS TREATED WITH INSULIN: Primary | ICD-10-CM

## 2024-01-05 RX ORDER — INSULIN GLARGINE 100 [IU]/ML
16 INJECTION, SOLUTION SUBCUTANEOUS NIGHTLY
Qty: 3 ML | Refills: 2 | Status: SHIPPED | OUTPATIENT
Start: 2024-01-05 | End: 2024-01-17

## 2024-01-05 NOTE — TELEPHONE ENCOUNTER
----- Message from aNncy Sargent sent at 1/5/2024 11:34 AM CST -----  Regarding: return call  Type:  Patient Returning Call    Who Called:Kimi  Who Left Message for Patient:  Does the patient know what this is regarding?:  Would the patient rather a call back or a response via [employer]?   Best Call Back Number:[phone] or [phone]  Additional Information:

## 2024-01-11 ENCOUNTER — TELEPHONE (OUTPATIENT)
Dept: FAMILY MEDICINE | Facility: CLINIC | Age: 39
End: 2024-01-11
Payer: MEDICARE

## 2024-01-11 NOTE — TELEPHONE ENCOUNTER
----- Message from Park Neil sent at 1/11/2024  1:01 PM CST -----  Regarding: advice  Type:  Needs Medical Advice    Who Called: pt    Best Call Back Number: [phone]  Additional Information: was told to call with blood sugar reading.     1/7 - 141  1/8 - 317  1/9 - 259  1/10 - 167  1/11 - 201

## 2024-01-11 NOTE — TELEPHONE ENCOUNTER
Reached out to Faustina Ram, RN with Diabetes Education to reach out to patient. Below is the encounter:    Spoke to pt. She has been instructed to reach out to Insurance to see what long acting insulin they cover and Test glucose tid ac meals then take insulin 5-15 min prior to meals.  State she has been taking 38 units novolog for breakfast and at bedtime.  Discussed timing of novolog only priort to meals and call next Tuesday with glucose report so we can adjust doses accordingly until back on long acting

## 2024-01-16 NOTE — TELEPHONE ENCOUNTER
Per Faustina Ram, RN:    Pt. Called this am with Glucose readings. States taking 38 units Novolog 5-15 min prior to breakfast and supper and 10 units prior to lunch.  She has not reached out to insurance to find out what long acting insulin is covered but after stressed importance she will reach out today and report back to me. Glucose readings in am are 118-183Lunch 161-180 and supper 116-202.

## 2024-01-17 ENCOUNTER — TELEPHONE (OUTPATIENT)
Dept: DIABETES | Facility: CLINIC | Age: 39
End: 2024-01-17
Payer: MEDICARE

## 2024-01-17 DIAGNOSIS — E11.65 TYPE 2 DIABETES MELLITUS WITH HYPERGLYCEMIA, WITH LONG-TERM CURRENT USE OF INSULIN: Primary | ICD-10-CM

## 2024-01-17 DIAGNOSIS — Z79.4 TYPE 2 DIABETES MELLITUS WITH HYPERGLYCEMIA, WITH LONG-TERM CURRENT USE OF INSULIN: Primary | ICD-10-CM

## 2024-01-17 RX ORDER — INSULIN GLARGINE 100 [IU]/ML
16 INJECTION, SOLUTION SUBCUTANEOUS NIGHTLY
Qty: 15 ML | Refills: 3 | Status: SHIPPED | OUTPATIENT
Start: 2024-01-17 | End: 2025-01-16

## 2024-01-17 RX ORDER — PEN NEEDLE, DIABETIC 31 GX5/16"
NEEDLE, DISPOSABLE MISCELLANEOUS
Qty: 100 EACH | Refills: 3 | Status: SHIPPED | OUTPATIENT
Start: 2024-01-17

## 2024-01-17 RX ORDER — INSULIN ASPART 100 [IU]/ML
35 INJECTION, SOLUTION INTRAVENOUS; SUBCUTANEOUS 2 TIMES DAILY WITH MEALS
Qty: 63 ML | Refills: 3
Start: 2024-01-17 | End: 2024-04-01

## 2024-01-17 NOTE — TELEPHONE ENCOUNTER
"I have ordered the following medications. Please notify the patient.      Medications Ordered This Encounter   Medications    BD ULTRA-FINE SHORT PEN NEEDLE 31 gauge x 5/16" Ndle     Sig: Use TID with Novolog and Semglee.     Dispense:  100 each     Refill:  3    insulin (SEMGLEE PEN U-100 INSULIN) glargine 100 units/mL SubQ pen     Sig: Inject 16 Units into the skin every evening.     Dispense:  15 mL     Refill:  3     Please adjust for a 90-day supply.    insulin aspart U-100 (NOVOLOG FLEXPEN U-100 INSULIN) 100 unit/mL (3 mL) InPn pen     Sig: Inject 35 Units into the skin 2 (two) times daily with meals.     Dispense:  63 mL     Refill:  3     "

## 2024-01-17 NOTE — TELEPHONE ENCOUNTER
Pt. Called to report Insurance covers Semglee, toujeo, and tresiba, as well as ozempic.   She was on Lantus 16 units Qhs so Semglee would be best one to use for her long acting.  She will need new rx for this as well as needles and refill of novolog since taking larger doses the last few weeks since out of Lantus. She may also need to decrease dose of novolog with meals since adding long acting back in. She was on 35 units with meals bid.   Glucose readings last two days are 144-153 fbs, 165 lunch and 176 supper. Thanks         11:52 am Spoke with pt. To notify of lantus d/c and add Semglee 16 units Qhs and Change novolog to 35 units bid and call one week with glucose readings. States understanding.

## 2024-01-26 DIAGNOSIS — E11.65 TYPE 2 DIABETES MELLITUS WITH HYPERGLYCEMIA, WITH LONG-TERM CURRENT USE OF INSULIN: Primary | ICD-10-CM

## 2024-01-26 DIAGNOSIS — Z79.4 TYPE 2 DIABETES MELLITUS WITH HYPERGLYCEMIA, WITH LONG-TERM CURRENT USE OF INSULIN: Primary | ICD-10-CM

## 2024-01-26 RX ORDER — SITAGLIPTIN AND METFORMIN HYDROCHLORIDE 1000; 50 MG/1; MG/1
TABLET, FILM COATED ORAL
Qty: 180 TABLET | Refills: 3 | Status: SHIPPED | OUTPATIENT
Start: 2024-01-26

## 2024-02-01 ENCOUNTER — TELEPHONE (OUTPATIENT)
Dept: DIABETES | Facility: CLINIC | Age: 39
End: 2024-02-01
Payer: MEDICARE

## 2024-02-01 DIAGNOSIS — Z79.4 TYPE 2 DIABETES MELLITUS WITH HYPERGLYCEMIA, WITH LONG-TERM CURRENT USE OF INSULIN: Primary | ICD-10-CM

## 2024-02-01 DIAGNOSIS — E11.65 TYPE 2 DIABETES MELLITUS WITH HYPERGLYCEMIA, WITH LONG-TERM CURRENT USE OF INSULIN: Primary | ICD-10-CM

## 2024-02-01 RX ORDER — SEMAGLUTIDE 0.68 MG/ML
0.25 INJECTION, SOLUTION SUBCUTANEOUS
Qty: 6 ML | Refills: 0 | Status: SHIPPED | OUTPATIENT
Start: 2024-02-01 | End: 2024-05-09

## 2024-02-01 NOTE — TELEPHONE ENCOUNTER
I have ordered the following medication. Please have her continue to monitor her blood sugars and follow-up her readings with you. Thank you!     Medications Ordered This Encounter   Medications    semaglutide (OZEMPIC) 0.25 mg or 0.5 mg (2 mg/3 mL) pen injector     Sig: Inject 0.25 mg into the skin every 7 days.     Dispense:  6 mL     Refill:  0     Please adjust for a 90-day-supply.

## 2024-02-01 NOTE — TELEPHONE ENCOUNTER
Pt. Called weekly with glucose readings. See attached.  She could benefit from GLP1, Ozempic was on her covered meds last time she called. She is open to trying this if you agree. Second option would be to increase Semglee to 18-20       units Qhs.  Please advise. Thank you.

## 2024-02-16 DIAGNOSIS — E11.9 TYPE 2 DIABETES MELLITUS TREATED WITH INSULIN: ICD-10-CM

## 2024-02-16 DIAGNOSIS — E78.2 MIXED HYPERLIPIDEMIA: ICD-10-CM

## 2024-02-16 DIAGNOSIS — Z79.4 TYPE 2 DIABETES MELLITUS TREATED WITH INSULIN: ICD-10-CM

## 2024-02-16 RX ORDER — ATORVASTATIN CALCIUM 20 MG/1
TABLET, FILM COATED ORAL
Qty: 90 TABLET | Refills: 0 | Status: SHIPPED | OUTPATIENT
Start: 2024-02-16

## 2024-03-29 DIAGNOSIS — E11.65 TYPE 2 DIABETES MELLITUS WITH HYPERGLYCEMIA, WITH LONG-TERM CURRENT USE OF INSULIN: ICD-10-CM

## 2024-03-29 DIAGNOSIS — Z79.4 TYPE 2 DIABETES MELLITUS WITH HYPERGLYCEMIA, WITH LONG-TERM CURRENT USE OF INSULIN: ICD-10-CM

## 2024-04-01 RX ORDER — INSULIN ASPART 100 [IU]/ML
INJECTION, SOLUTION INTRAVENOUS; SUBCUTANEOUS
Qty: 60 ML | Refills: 2 | Status: SHIPPED | OUTPATIENT
Start: 2024-04-01

## 2024-05-08 DIAGNOSIS — E11.65 TYPE 2 DIABETES MELLITUS WITH HYPERGLYCEMIA, WITH LONG-TERM CURRENT USE OF INSULIN: Primary | ICD-10-CM

## 2024-05-08 DIAGNOSIS — Z79.4 TYPE 2 DIABETES MELLITUS WITH HYPERGLYCEMIA, WITH LONG-TERM CURRENT USE OF INSULIN: Primary | ICD-10-CM

## 2024-05-09 RX ORDER — SEMAGLUTIDE 0.68 MG/ML
INJECTION, SOLUTION SUBCUTANEOUS
Qty: 6 ML | Refills: 0 | Status: SHIPPED | OUTPATIENT
Start: 2024-05-09

## 2024-05-19 DIAGNOSIS — Z79.4 TYPE 2 DIABETES MELLITUS WITH HYPERGLYCEMIA, WITH LONG-TERM CURRENT USE OF INSULIN: ICD-10-CM

## 2024-05-19 DIAGNOSIS — E11.65 TYPE 2 DIABETES MELLITUS WITH HYPERGLYCEMIA, WITH LONG-TERM CURRENT USE OF INSULIN: ICD-10-CM

## 2024-05-20 RX ORDER — GLIMEPIRIDE 4 MG/1
4 TABLET ORAL
Qty: 90 TABLET | Refills: 3 | Status: SHIPPED | OUTPATIENT
Start: 2024-05-20

## 2024-05-31 ENCOUNTER — PATIENT OUTREACH (OUTPATIENT)
Facility: CLINIC | Age: 39
End: 2024-05-31
Payer: MEDICARE

## 2024-06-03 DIAGNOSIS — J30.9 ALLERGIC RHINITIS, UNSPECIFIED SEASONALITY, UNSPECIFIED TRIGGER: ICD-10-CM

## 2024-06-03 RX ORDER — CETIRIZINE HYDROCHLORIDE 10 MG/1
10 TABLET ORAL
Qty: 90 TABLET | Refills: 3 | Status: SHIPPED | OUTPATIENT
Start: 2024-06-03

## 2024-07-16 ENCOUNTER — OFFICE VISIT (OUTPATIENT)
Dept: FAMILY MEDICINE | Facility: CLINIC | Age: 39
End: 2024-07-16
Payer: MEDICARE

## 2024-07-16 ENCOUNTER — CLINICAL SUPPORT (OUTPATIENT)
Dept: DIABETES | Facility: CLINIC | Age: 39
End: 2024-07-16
Payer: MEDICARE

## 2024-07-16 ENCOUNTER — LAB VISIT (OUTPATIENT)
Dept: LAB | Facility: HOSPITAL | Age: 39
End: 2024-07-16
Attending: STUDENT IN AN ORGANIZED HEALTH CARE EDUCATION/TRAINING PROGRAM
Payer: MEDICARE

## 2024-07-16 VITALS — HEIGHT: 59 IN | WEIGHT: 293 LBS | BODY MASS INDEX: 59.07 KG/M2

## 2024-07-16 VITALS
TEMPERATURE: 98 F | HEIGHT: 59 IN | OXYGEN SATURATION: 96 % | DIASTOLIC BLOOD PRESSURE: 85 MMHG | WEIGHT: 293 LBS | SYSTOLIC BLOOD PRESSURE: 123 MMHG | BODY MASS INDEX: 59.07 KG/M2 | RESPIRATION RATE: 16 BRPM | HEART RATE: 96 BPM

## 2024-07-16 DIAGNOSIS — Z79.4 TYPE 2 DIABETES MELLITUS WITH HYPERGLYCEMIA, WITH LONG-TERM CURRENT USE OF INSULIN: Primary | ICD-10-CM

## 2024-07-16 DIAGNOSIS — E55.9 VITAMIN D DEFICIENCY: ICD-10-CM

## 2024-07-16 DIAGNOSIS — E11.65 TYPE 2 DIABETES MELLITUS WITH HYPERGLYCEMIA, WITH LONG-TERM CURRENT USE OF INSULIN: ICD-10-CM

## 2024-07-16 DIAGNOSIS — D72.829 LEUKOCYTOSIS, UNSPECIFIED TYPE: Primary | ICD-10-CM

## 2024-07-16 DIAGNOSIS — E11.65 TYPE 2 DIABETES MELLITUS WITH HYPERGLYCEMIA, WITH LONG-TERM CURRENT USE OF INSULIN: Primary | ICD-10-CM

## 2024-07-16 DIAGNOSIS — F20.9 SCHIZOPHRENIA, UNSPECIFIED TYPE: ICD-10-CM

## 2024-07-16 DIAGNOSIS — E78.2 MIXED HYPERLIPIDEMIA: ICD-10-CM

## 2024-07-16 DIAGNOSIS — E03.9 HYPOTHYROIDISM, UNSPECIFIED TYPE: ICD-10-CM

## 2024-07-16 DIAGNOSIS — E66.01 MORBID (SEVERE) OBESITY DUE TO EXCESS CALORIES: ICD-10-CM

## 2024-07-16 DIAGNOSIS — G47.00 INSOMNIA, UNSPECIFIED TYPE: ICD-10-CM

## 2024-07-16 DIAGNOSIS — Z79.4 TYPE 2 DIABETES MELLITUS WITH HYPERGLYCEMIA, WITH LONG-TERM CURRENT USE OF INSULIN: ICD-10-CM

## 2024-07-16 DIAGNOSIS — F41.1 GENERALIZED ANXIETY DISORDER: ICD-10-CM

## 2024-07-16 DIAGNOSIS — G47.33 OBSTRUCTIVE SLEEP APNEA SYNDROME: ICD-10-CM

## 2024-07-16 DIAGNOSIS — I10 PRIMARY HYPERTENSION: ICD-10-CM

## 2024-07-16 DIAGNOSIS — D50.9 IRON DEFICIENCY ANEMIA, UNSPECIFIED IRON DEFICIENCY ANEMIA TYPE: ICD-10-CM

## 2024-07-16 DIAGNOSIS — J30.9 ALLERGIC RHINITIS, UNSPECIFIED SEASONALITY, UNSPECIFIED TRIGGER: ICD-10-CM

## 2024-07-16 LAB
25(OH)D3+25(OH)D2 SERPL-MCNC: 26 NG/ML (ref 30–80)
ALBUMIN SERPL-MCNC: 3.7 G/DL (ref 3.5–5)
ALBUMIN/GLOB SERPL: 0.8 RATIO (ref 1.1–2)
ALP SERPL-CCNC: 91 UNIT/L (ref 40–150)
ALT SERPL-CCNC: 49 UNIT/L (ref 0–55)
ANION GAP SERPL CALC-SCNC: 14 MEQ/L
AST SERPL-CCNC: 43 UNIT/L (ref 5–34)
BASOPHILS # BLD AUTO: 0.06 X10(3)/MCL
BASOPHILS NFR BLD AUTO: 0.4 %
BILIRUB SERPL-MCNC: 0.9 MG/DL
BUN SERPL-MCNC: 21.1 MG/DL (ref 7–18.7)
CALCIUM SERPL-MCNC: 9 MG/DL (ref 8.4–10.2)
CHLORIDE SERPL-SCNC: 101 MMOL/L (ref 98–107)
CHOLEST SERPL-MCNC: 144 MG/DL
CHOLEST/HDLC SERPL: 4 {RATIO} (ref 0–5)
CO2 SERPL-SCNC: 24 MMOL/L (ref 22–29)
CREAT SERPL-MCNC: 1.19 MG/DL (ref 0.55–1.02)
CREAT/UREA NIT SERPL: 18
EOSINOPHIL # BLD AUTO: 0.09 X10(3)/MCL (ref 0–0.9)
EOSINOPHIL NFR BLD AUTO: 0.6 %
ERYTHROCYTE [DISTWIDTH] IN BLOOD BY AUTOMATED COUNT: 12.6 % (ref 11.5–17)
EST. AVERAGE GLUCOSE BLD GHB EST-MCNC: 297.7 MG/DL
GFR SERPLBLD CREATININE-BSD FMLA CKD-EPI: 59 ML/MIN/1.73/M2
GLOBULIN SER-MCNC: 4.6 GM/DL (ref 2.4–3.5)
GLUCOSE SERPL-MCNC: 378 MG/DL (ref 74–100)
HBA1C MFR BLD: 12 %
HCT VFR BLD AUTO: 37.5 % (ref 37–47)
HDLC SERPL-MCNC: 33 MG/DL (ref 35–60)
HGB BLD-MCNC: 11.9 G/DL (ref 12–16)
IMM GRANULOCYTES # BLD AUTO: 0.07 X10(3)/MCL (ref 0–0.04)
IMM GRANULOCYTES NFR BLD AUTO: 0.5 %
LDLC SERPL CALC-MCNC: 80 MG/DL (ref 50–140)
LYMPHOCYTES # BLD AUTO: 4.49 X10(3)/MCL (ref 0.6–4.6)
LYMPHOCYTES NFR BLD AUTO: 30.3 %
MCH RBC QN AUTO: 30.1 PG (ref 27–31)
MCHC RBC AUTO-ENTMCNC: 31.7 G/DL (ref 33–36)
MCV RBC AUTO: 94.7 FL (ref 80–94)
MONOCYTES # BLD AUTO: 0.68 X10(3)/MCL (ref 0.1–1.3)
MONOCYTES NFR BLD AUTO: 4.6 %
NEUTROPHILS # BLD AUTO: 9.44 X10(3)/MCL (ref 2.1–9.2)
NEUTROPHILS NFR BLD AUTO: 63.6 %
NRBC BLD AUTO-RTO: 0 %
PLATELET # BLD AUTO: 356 X10(3)/MCL (ref 130–400)
PMV BLD AUTO: 9.5 FL (ref 7.4–10.4)
POTASSIUM SERPL-SCNC: 4.3 MMOL/L (ref 3.5–5.1)
PROT SERPL-MCNC: 8.3 GM/DL (ref 6.4–8.3)
RBC # BLD AUTO: 3.96 X10(6)/MCL (ref 4.2–5.4)
SODIUM SERPL-SCNC: 139 MMOL/L (ref 136–145)
T4 FREE SERPL-MCNC: 1.1 NG/DL (ref 0.7–1.48)
TRIGL SERPL-MCNC: 155 MG/DL (ref 37–140)
TSH SERPL-ACNC: 4.88 UIU/ML (ref 0.35–4.94)
VLDLC SERPL CALC-MCNC: 31 MG/DL
WBC # BLD AUTO: 14.83 X10(3)/MCL (ref 4.5–11.5)

## 2024-07-16 PROCEDURE — 84443 ASSAY THYROID STIM HORMONE: CPT

## 2024-07-16 PROCEDURE — 36415 COLL VENOUS BLD VENIPUNCTURE: CPT

## 2024-07-16 PROCEDURE — G0108 DIAB MANAGE TRN  PER INDIV: HCPCS | Mod: ,,, | Performed by: INTERNAL MEDICINE

## 2024-07-16 PROCEDURE — 82306 VITAMIN D 25 HYDROXY: CPT

## 2024-07-16 PROCEDURE — 99214 OFFICE O/P EST MOD 30 MIN: CPT | Mod: ,,, | Performed by: STUDENT IN AN ORGANIZED HEALTH CARE EDUCATION/TRAINING PROGRAM

## 2024-07-16 PROCEDURE — 83036 HEMOGLOBIN GLYCOSYLATED A1C: CPT

## 2024-07-16 PROCEDURE — 80053 COMPREHEN METABOLIC PANEL: CPT

## 2024-07-16 PROCEDURE — 84439 ASSAY OF FREE THYROXINE: CPT

## 2024-07-16 PROCEDURE — 85025 COMPLETE CBC W/AUTO DIFF WBC: CPT

## 2024-07-16 PROCEDURE — 80061 LIPID PANEL: CPT

## 2024-07-16 RX ORDER — LANCETS 33 GAUGE
EACH MISCELLANEOUS
Qty: 100 EACH | Refills: 3 | Status: SHIPPED | OUTPATIENT
Start: 2024-07-16

## 2024-07-16 RX ORDER — SEMAGLUTIDE 0.68 MG/ML
0.5 INJECTION, SOLUTION SUBCUTANEOUS
Qty: 12 ML | Refills: 0 | Status: SHIPPED | OUTPATIENT
Start: 2024-07-16

## 2024-07-16 RX ORDER — INSULIN ASPART 100 [IU]/ML
38 INJECTION, SOLUTION INTRAVENOUS; SUBCUTANEOUS 2 TIMES DAILY WITH MEALS
Qty: 69 ML | Refills: 3 | Status: SHIPPED | OUTPATIENT
Start: 2024-07-16

## 2024-07-16 RX ORDER — LOSARTAN POTASSIUM 50 MG/1
50 TABLET ORAL DAILY
Qty: 90 TABLET | Refills: 3 | Status: SHIPPED | OUTPATIENT
Start: 2024-07-16

## 2024-07-16 RX ORDER — INSULIN GLARGINE 100 [IU]/ML
18 INJECTION, SOLUTION SUBCUTANEOUS NIGHTLY
Qty: 15 ML | Refills: 3 | Status: SHIPPED | OUTPATIENT
Start: 2024-07-16 | End: 2025-07-16

## 2024-07-16 NOTE — PROGRESS NOTES
"Diabetes Care Specialist Progress Note  Author: Faustina Ram RN  Date: 7/16/2024    Intake    Program Intake  Reason for Diabetes Program Visit:: Initial Diabetes Assessment  Current diabetes risk level:: high  In the last 12 months, have you:: none  Permission to speak with others about care:: yes    Current Diabetes Treatment: Oral Medications, Insulin, DM Injectables  Oral Medication Type/Dose: glimepiride 4mg daily  DM Injectables Type/Dose: Ozempic .5mg weekly Will be increased to 1mg as of today with new rx  Method of delivery?: Injections  Injection Type: Pens  Pen Type/Dose: Semplee 18 units Qhs, Novolog 38 units bid ac breakfast and lunch    Patient is able to identify current diabetes medications, dosages, and appropriate timing of medications.: yes  Patient reports problems or concerns with current medication regimen.: no  Patient is  aware that some diabetes medications can cause low blood sugar?: Yes  Medication Skills Assessment Completed:: Yes  Assessment indicates:: Adequate understanding  Area of need?: No    Continuous Glucose Monitoring  Patient has CGM: No    Lab Results   Component Value Date    HGBA1C 12.0 (H) 07/16/2024       Weight: (!) 141.9 kg (312 lb 12.8 oz)   Height: 4' 11" (149.9 cm)   Body mass index is 63.18 kg/m².    Lifestyle Coping Support & Clinical    Lifestyle/Coping/Support  Compared to other people your age, how would you rate your health?: Poor  Does anyone in your family have diabetes or does anyone in your family support you in your diabetes care?: Father has dm and nephew lives with them and is good support  List anything about Diabetes that causes you stress?: watching diet  How do you deal with stress/distress?: naps  Learning Barriers:: None  Culture or [employer] beliefs that may impact ability to access healthcare: No  Psychosocial/Coping Skills Assessment Completed: : Yes  Assessment indicates:: Adequate understanding  Area of need?: No    Problem Review  Active " Comorbidities: Hypertension, Hyperlipidemia/Dyslipidemia, Obesity, Mental Health Condition(s)    Diabetes Self-Management Skills Assessment    Diabetes Disease Process/Treatment Options  Diabetes Type?: Type II  When were you diagnosed?: 9718-8294  If previous diabetes education, when/where:: This is second episode with me at [facility]  What are your goals for this education session?: review nutrition  Is patient aware of what causes diabetes?: Yes  Does patient understand the pathophysiology of diabetes?: Yes  Diabetes Disease Process/Treatment Options: Skills Assessment Completed: Yes  Assessment indicates:: Adequate understanding  Area of need?: No    Nutrition/Healthy Eating  Meal Plan 24 Hour Recall - Breakfast: skips, sleeps late  Meal Plan 24 Hour Recall - Lunch: chicken and rice  Meal Plan 24 Hour Recall - Dinner: red beans and rice with ham  Meal Plan 24 Hour Recall - Snack: cake and cookies  Meal Plan 24 Hour Recall - Beverage: water and diet drinks  Who shops/cooks?: she orders groceries on line and gets delivered, nephew cooks  Challenges to healthy eating:: lack of will power, portion control  Nutrition/Healthy Eating Skills Assessment Completed:: Yes  Assessment indicates:: Knowledge deficit, Instruction Needed  Area of need?: Yes    Physical Activity/Exercise  Patient's daily activity level:: sedentary  Patient formally exercises outside of work.: no  Reasons for not exercising:: other (see comments) (Motivation and limited mobility)  Physical Activity/Exercise Skills Assessment Completed: : No  Deffered due to:: Time    Home Blood Glucose Monitoring  Patient states that blood sugar is checked at home daily.: yes  Monitoring Method:: home glucometer  Home glucometer meter type:: One Touch Verio  When you check what is your typical blood sugar range? : 180-300  What is your A1c Target?: <7%  Home Blood Glucose Monitoring Skills Assessment Completed: : Yes  Assessment indicates:: Adequate  understanding  Area of need?: No    Acute Complications  Have you ever had hypoglycemia (low BG 70 or less)?: no  Have you ever had hyperglycemia (high  or more)?: yes  How often and what are your symptoms?: 350 only once or twice 250-300 often, fatigue  How do you treat hyperglycemia? : naps and takes insulin  Have you ever had DKA?: no  Do you ever test for ketones?: no  Acute Complications Skills Assessment Completed: : Yes  Assessment indicates:: Adequate understanding  Area of need?: No    Chronic Complications  Reviewed health maintenance: yes  Have you completed your annual diabetes maintenance labwork? : yes  Chronic Complications Skills Assessment Completed: : No  Deferred due to:: Time      Assessment Summary and Plan    Based on today's diabetes care assessment, the following areas of need were identified:          7/16/2024    12:02 AM   Areas of Need   Diabetes Disease Process/Treatment Options No    Nutrition/Healthy Eating Yes see care plan    Medication No   Home Blood Glucose Monitoring No   Acute Complications No       Today's interventions were provided through individual discussion, instruction, and written materials were provided.      Patient verbalized understanding of instruction and written materials.  Pt was able to return back demonstration of instructions today. Patient understood key points, needs reinforcement and further instruction.     Diabetes Self-Management Care Plan:    Today's Diabetes Self-Management Care Plan was developed with Kimi's input. Kimi has agreed to work toward the following goal(s) to improve his/her overall diabetes control.      Care Plan: Diabetes Management   Updates made since 6/16/2024 12:00 AM        Problem: Healthy Eating         Long-Range Goal: Pt. agrees to eat a sweet treat once a week on Fridays    Start Date: 7/16/2024   Expected End Date: 10/17/2024   Priority: High   Barriers: Lack of Motivation to Change   Note:    7/16/24 Reviewed food  exchange list, snack ideas and meal planning tips and gave handouts. Admits to eating lots of carbs and sweets. Discussed importance on adding non-starchy vegetables to meals to get better nutrition. Showed her on her grocery nate what to try and she was excited about the food and said it looked good.  Used food photos to help with meal planning.  She likes most non-starchy veggies she is just not used to eating them. Spoke with nephew with her permission to show him how to balance meals he cooks and he states he tells her to eat better but she often does not want to.        Task: Provided Sample plate method and reviewed the use of the plate to estimate amounts of carbohydrate per meal. Completed 7/16/2024        Problem: Blood Glucose Self-Monitoring         Long-Range Goal: Patient agrees to check and record blood sugars 2 times per day.    Start Date: 7/16/2024   Expected End Date: 10/17/2024   Priority: Medium   Barriers: Lack of Motivation to Change   Note:    7/16/24 She was testing tid a few months ago but since then has only been testing once a day. She knows she needs to test more and will start testing bid and keep log.  Discussed possible cgm and how it can help her get A1c down.  She is not sure she wants to use this but will think about it if she can't get A1c down. States she has no problems testing fingers.        Task: Reviewed the importance of self-monitoring blood glucose and keeping logs. Completed 7/16/2024        Task: Provided patient with blood glucose logs, reviewed appropriate timing and frequency to SMBG, education on parameters on when to notify provider and advised patient to bring logs to all appts with PCP/Endocrinologist/Diabetes Care Specialist. Completed 7/16/2024        Task: Discussed ways to minimize pain when monitoring blood glucose. Completed 7/16/2024          Follow Up Plan     Follow up in about 3 months (around 10/16/2024) for chronic complications, exercise, medications,  and continue nutrition.. Appt made with pt. Today for 10/17/24 at 1030 after pcp appt.    Today's care plan and follow up schedule was discussed with patient.  Kimi verbalized understanding of the care plan, goals, and agrees to follow up plan.        The patient was encouraged to communicate with his/her health care provider/physician and care team regarding his/her condition(s) and treatment.  I provided the patient with my contact information today and encouraged to contact me via phone or [facility] Patient Portal as needed.     Length of Visit   Total Time: 60 Minutes

## 2024-07-16 NOTE — ASSESSMENT & PLAN NOTE
- A1c for routine monitoring.  - Patient following with Diabetes Education.  - Patient following with [facility] for eye exams.  - Diabetic foot exam UTD.  - Increasing Ozempic from 0.25mg to 0.5mg weekly.  - Continue Lantus 18U nightly and Novolog 38U BID. Patient will continue to titrate her insulin regimen based on blood sugars and contact the clinic next week with readings/insulin regimen.  - Continue Janumet XR 50mg-1000mg BID and Amaryl 4mg daily.

## 2024-07-16 NOTE — PROGRESS NOTES
Subjective:      Patient ID: Kimi Bell is a 38 y.o.  female.    Chief Complaint: Chronic Medical Management    NIDDMII: A1c 12 from 01/02/24. Patient checks her blood sugars daily. She says her sugars are ranging 200-200. Patient following with [facility] for her eye exams. Patient taking Novolog 38U BID, Janumet XR nightly, Amaryl daily, and Glargine 18U nightly. She is inquiring about increasing Ozempic dose. She is following with Diabetes Education.    HTN/HLD: /85. Patient taking Losartan, Metoprolol, and Lipitor.    Hypothyroidism: Patient states she used to be on thyroid medication, but this was discontinued due to stable thyroid numbers.    Schizophrenia/Anxiety/Insomnia: Patient following with Psychiatry. Patient taking Abilify, Benztropine, and Effexor.    THEODORE: Patient following with Neurology. She states compliance with CPAP nightly.    Iron Deficiency Anemia: Patient states compliance with iron supplementation during her menstrual cycles.    Vitamin D Deficiency: Vitamin D level 27.4, low from 03/14/23. Patient says she's not started vitamin D supplementation yet.    Allergic Rhinitis: Patient taking Zyrtec and Flonase.     Elevated AST/ALT: Patient denies any alcohol or Tylenol use.     Preventative Health: Medicare Wellness completed on 01/02/24.    Review of Systems   Constitutional:  Negative for activity change, appetite change, chills, diaphoresis, fatigue, fever and unexpected weight change.   Eyes:  Negative for visual disturbance.   Respiratory:  Negative for apnea, cough, shortness of breath, wheezing and stridor.    Cardiovascular:  Negative for chest pain, palpitations and leg swelling.   Gastrointestinal:  Negative for abdominal pain, blood in stool, constipation, diarrhea, nausea and vomiting.   Genitourinary:  Negative for dysuria and hematuria.   Musculoskeletal:  Negative for arthralgias, back pain, gait problem and myalgias.   Skin:  Negative for  "rash and wound.   Allergic/Immunologic: Positive for environmental allergies.   Neurological:  Negative for dizziness, syncope, weakness, numbness and headaches.   Psychiatric/Behavioral:  Positive for dysphoric mood. Negative for behavioral problems, self-injury, sleep disturbance and suicidal ideas. The patient is not nervous/anxious.      Objective:   /85 (BP Location: Right arm)   Pulse 96   Temp 97.8 °F (36.6 °C) (Temporal)   Resp 16   Ht 4' 11" (1.499 m)   Wt (!) 142.1 kg (313 lb 4.8 oz)   SpO2 96%   BMI 63.28 kg/m²     Physical Exam  Vitals and nursing note reviewed.   Constitutional:       General: She is not in acute distress.     Appearance: Normal appearance. She is obese. She is not ill-appearing or toxic-appearing.   HENT:      Head: Normocephalic and atraumatic.      Mouth/Throat:      Mouth: Mucous membranes are moist.      Pharynx: Oropharynx is clear. No oropharyngeal exudate or posterior oropharyngeal erythema.   Eyes:      Conjunctiva/sclera: Conjunctivae normal.   Cardiovascular:      Rate and Rhythm: Normal rate and regular rhythm.      Heart sounds: Normal heart sounds. No murmur heard.  Pulmonary:      Effort: Pulmonary effort is normal. No respiratory distress.      Breath sounds: Normal breath sounds.   Abdominal:      General: There is no distension.      Palpations: Abdomen is soft.      Tenderness: There is no abdominal tenderness.   Musculoskeletal:         General: No deformity.      Right lower leg: No edema.      Left lower leg: No edema.   Skin:     General: Skin is warm and dry.      Findings: No lesion or rash.   Neurological:      General: No focal deficit present.      Mental Status: She is alert. Mental status is at baseline.      Motor: No weakness.      Coordination: Coordination normal.   Psychiatric:         Mood and Affect: Mood normal.         Behavior: Behavior normal.         Thought Content: Thought content normal.         Judgment: Judgment normal. "       Assessment/Plan:   1. Type 2 diabetes mellitus with hyperglycemia, with long-term current use of insulin  Assessment & Plan:  - A1c for routine monitoring.  - Patient following with Diabetes Education.  - Patient following with [facility] for eye exams.  - Diabetic foot exam UTD.  - Increasing Ozempic from 0.25mg to 0.5mg weekly.  - Continue Lantus 18U nightly and Novolog 38U BID. Patient will continue to titrate her insulin regimen based on blood sugars and contact the clinic next week with readings/insulin regimen.  - Continue Janumet XR 50mg-1000mg BID and Amaryl 4mg daily.    Orders:  -     Hemoglobin A1C; Future; Expected date: 07/16/2024  -     Comprehensive Metabolic Panel; Future; Expected date: 07/16/2024  -     CBC Auto Differential; Future; Expected date: 07/16/2024  -     insulin glargine U-100, Lantus, (SEMGLEE PEN U-100 INSULIN) 100 unit/mL (3 mL) InPn pen; Inject 18 Units into the skin every evening.  Dispense: 15 mL; Refill: 3  -     insulin aspart U-100 (NOVOLOG) 100 unit/mL (3 mL) InPn pen; Inject 38 Units into the skin 2 (two) times daily with meals.  Dispense: 69 mL; Refill: 3  -     blood sugar diagnostic (ONETOUCH ULTRA TEST) Strp; CHECK BLOOD SUGAR TWICE DAILY  Dispense: 200 strip; Refill: 2  -     ONETOUCH DELICA PLUS LANCET 33 gauge Misc; Check blood sugar twice daily.  Dispense: 100 each; Refill: 3  -     semaglutide (OZEMPIC) 0.25 mg or 0.5 mg (2 mg/3 mL) pen injector; Inject 0.5 mg into the skin every 7 days.  Dispense: 12 mL; Refill: 0    2. Primary hypertension  Assessment & Plan:  - BP well-controlled.  - Continue Losartan 50mg daily and Metoprolol 50mg BID.    Orders:  -     Comprehensive Metabolic Panel; Future; Expected date: 07/16/2024  -     CBC Auto Differential; Future; Expected date: 07/16/2024  -     losartan (COZAAR) 50 MG tablet; Take 1 tablet (50 mg total) by mouth once daily.  Dispense: 90 tablet; Refill: 3    3. Mixed hyperlipidemia  Assessment & Plan:  -  Continue Lipitor 20mg daily.    Orders:  -     Lipid Panel; Future; Expected date: 07/16/2024    4. Hypothyroidism, unspecified type  Assessment & Plan:  - Thyroid labs for routine monitoring.  - Continue to monitor.    Orders:  -     TSH; Future; Expected date: 07/16/2024  -     T4, Free; Future; Expected date: 07/16/2024    5. Schizophrenia, unspecified type  Assessment & Plan:  - Stable.  - Patient following with Psychiatry.  - Abilify 30mg daily, Benztropine 1mg BID, and Effexor XR 140mg daily per Psychiatry.      6. Generalized anxiety disorder  Assessment & Plan:  - Refer to Schizophrenia plan.      7. Insomnia, unspecified type  Assessment & Plan:  - Refer to Schizophrenia plan.      8. Obstructive sleep apnea syndrome  Assessment & Plan:  - Stable.  - Patient following with Neurology.  - Patient states compliance with CPAP nightly.      9. Iron deficiency anemia, unspecified iron deficiency anemia type  Assessment & Plan:  - Continue iron supplementation.      10. Vitamin D deficiency  Assessment & Plan:  - Vitamin D level for routine monitoring.  - Recommend vitamin D supplementation daily OTC.    Orders:  -     Vitamin D; Future; Expected date: 07/16/2024    11. Allergic rhinitis, unspecified seasonality, unspecified trigger  Assessment & Plan:  - Stable.  - Continue Zyrtec 10mg daily and Flonase OTC.      12. Morbid (severe) obesity due to excess calories  Assessment & Plan:  - BMI 63  - Patient counseled regarding lifestyle modifications with diet and exercise.         Follow up in about 3 months (around 10/16/2024) for Chronic Medical Management.

## 2024-07-22 ENCOUNTER — TELEPHONE (OUTPATIENT)
Dept: FAMILY MEDICINE | Facility: CLINIC | Age: 39
End: 2024-07-22
Payer: MEDICARE

## 2024-07-22 NOTE — TELEPHONE ENCOUNTER
----- Message from Chary Person sent at 7/22/2024 11:14 AM CDT -----  Type:  Needs Medical Advice    Who Called: pt   Symptoms (please be specific):    How long has patient had these symptoms:    Pharmacy name and phone #:    Would the patient rather a call back or a response via [facility]?  Best Call Back Number:  [phone]  Additional Information:  calling to report blood sugar reading- 07/17 - 327  07/18 - 330  07/19 - 313  07/20 - 403  07/21 - 295  07/22 - 381

## 2024-07-22 NOTE — TELEPHONE ENCOUNTER
"Per Faustina Ram with Diabetes Education:    "As of 7/16/24 Her insulin was increased to 18 units Semglee Qhs and Novolog 38 units ac breakfast and lunch.  Glucose numbers are about the same as last week.  We are working on diet again to get glucose down. Discussed long term complications of high glucose. Spoke with pt. Today she has had no sweets in 6 days and glucose still high, but not moving around much and not cutting out extra carbs.   Instructed her to call me next Monday and if Glucose still high despite diet changes we will need to increase insulin again."  "

## 2024-07-29 ENCOUNTER — LAB VISIT (OUTPATIENT)
Dept: LAB | Facility: HOSPITAL | Age: 39
End: 2024-07-29
Attending: STUDENT IN AN ORGANIZED HEALTH CARE EDUCATION/TRAINING PROGRAM
Payer: MEDICARE

## 2024-07-29 DIAGNOSIS — Z79.4 TYPE 2 DIABETES MELLITUS WITH HYPERGLYCEMIA, WITH LONG-TERM CURRENT USE OF INSULIN: ICD-10-CM

## 2024-07-29 DIAGNOSIS — E11.65 TYPE 2 DIABETES MELLITUS WITH HYPERGLYCEMIA, WITH LONG-TERM CURRENT USE OF INSULIN: ICD-10-CM

## 2024-07-29 DIAGNOSIS — D72.829 LEUKOCYTOSIS, UNSPECIFIED TYPE: ICD-10-CM

## 2024-07-29 DIAGNOSIS — I10 PRIMARY HYPERTENSION: ICD-10-CM

## 2024-07-29 LAB
ALBUMIN SERPL-MCNC: 3.5 G/DL (ref 3.5–5)
ALBUMIN/GLOB SERPL: 0.8 RATIO (ref 1.1–2)
ALP SERPL-CCNC: 93 UNIT/L (ref 40–150)
ALT SERPL-CCNC: 52 UNIT/L (ref 0–55)
ANION GAP SERPL CALC-SCNC: 12 MEQ/L
AST SERPL-CCNC: 46 UNIT/L (ref 5–34)
BASOPHILS # BLD AUTO: 0.04 X10(3)/MCL
BASOPHILS NFR BLD AUTO: 0.3 %
BILIRUB SERPL-MCNC: 0.8 MG/DL
BUN SERPL-MCNC: 9.1 MG/DL (ref 7–18.7)
CALCIUM SERPL-MCNC: 8.8 MG/DL (ref 8.4–10.2)
CHLORIDE SERPL-SCNC: 103 MMOL/L (ref 98–107)
CO2 SERPL-SCNC: 24 MMOL/L (ref 22–29)
CREAT SERPL-MCNC: 0.93 MG/DL (ref 0.55–1.02)
CREAT/UREA NIT SERPL: 10
EOSINOPHIL # BLD AUTO: 0.07 X10(3)/MCL (ref 0–0.9)
EOSINOPHIL NFR BLD AUTO: 0.6 %
ERYTHROCYTE [DISTWIDTH] IN BLOOD BY AUTOMATED COUNT: 12.9 % (ref 11.5–17)
GFR SERPLBLD CREATININE-BSD FMLA CKD-EPI: >60 ML/MIN/1.73/M2
GLOBULIN SER-MCNC: 4.4 GM/DL (ref 2.4–3.5)
GLUCOSE SERPL-MCNC: 280 MG/DL (ref 74–100)
HCT VFR BLD AUTO: 36 % (ref 37–47)
HGB BLD-MCNC: 11.4 G/DL (ref 12–16)
IMM GRANULOCYTES # BLD AUTO: 0.06 X10(3)/MCL (ref 0–0.04)
IMM GRANULOCYTES NFR BLD AUTO: 0.5 %
LYMPHOCYTES # BLD AUTO: 4.35 X10(3)/MCL (ref 0.6–4.6)
LYMPHOCYTES NFR BLD AUTO: 36.3 %
MCH RBC QN AUTO: 30 PG (ref 27–31)
MCHC RBC AUTO-ENTMCNC: 31.7 G/DL (ref 33–36)
MCV RBC AUTO: 94.7 FL (ref 80–94)
MONOCYTES # BLD AUTO: 0.59 X10(3)/MCL (ref 0.1–1.3)
MONOCYTES NFR BLD AUTO: 4.9 %
NEUTROPHILS # BLD AUTO: 6.87 X10(3)/MCL (ref 2.1–9.2)
NEUTROPHILS NFR BLD AUTO: 57.4 %
NRBC BLD AUTO-RTO: 0 %
PLATELET # BLD AUTO: 360 X10(3)/MCL (ref 130–400)
PMV BLD AUTO: 9.2 FL (ref 7.4–10.4)
POTASSIUM SERPL-SCNC: 4.1 MMOL/L (ref 3.5–5.1)
PROT SERPL-MCNC: 7.9 GM/DL (ref 6.4–8.3)
RBC # BLD AUTO: 3.8 X10(6)/MCL (ref 4.2–5.4)
SODIUM SERPL-SCNC: 139 MMOL/L (ref 136–145)
WBC # BLD AUTO: 11.98 X10(3)/MCL (ref 4.5–11.5)

## 2024-07-29 PROCEDURE — 36415 COLL VENOUS BLD VENIPUNCTURE: CPT

## 2024-07-29 PROCEDURE — 85025 COMPLETE CBC W/AUTO DIFF WBC: CPT

## 2024-07-29 PROCEDURE — 80053 COMPREHEN METABOLIC PANEL: CPT

## 2024-07-30 ENCOUNTER — TELEPHONE (OUTPATIENT)
Dept: FAMILY MEDICINE | Facility: CLINIC | Age: 39
End: 2024-07-30
Payer: MEDICARE

## 2024-07-30 NOTE — TELEPHONE ENCOUNTER
Please instruct patient to administer an additional 10U of Novolog at lunch; so, have her administer 48U of Novlog with lunch and check her blood sugar 1-2 hours after lunch and let us know what it is.    Please have patient increase her Semglee to 25U tonight and to keep monitor her fasting blood sugars and report them to us by Friday as well.

## 2024-07-30 NOTE — TELEPHONE ENCOUNTER
Patient called to report that at 0900 her cbg was 354. This was after 38 units of novolog which she takes in the AM. She had increased her nightly dose of semglee to 20 units which is what she took last night. She has not had anything to eat yet today so this was fasting cbg. She said she did have a zero sugar sparkling water this morning.     She is due to take another 38 units novolog at lunch time, 4 mg glimepiride, and  mg tablet janumet this afternoon.    Patient is asymptomatic.please advise on how to proceed. Thank you

## 2024-08-20 ENCOUNTER — PATIENT OUTREACH (OUTPATIENT)
Facility: CLINIC | Age: 39
End: 2024-08-20
Payer: MEDICARE

## 2024-08-20 NOTE — PROGRESS NOTES
Health Maintenance Topic(s) Outreach Outcomes & Actions Taken:    Eye Exam - Outreach Outcomes & Actions Taken  : External Records Requested & Care Team Updated if Applicable and FARZANA sent to [facility]     Additional Notes:

## 2024-08-20 NOTE — LETTER
AUTHORIZATION FOR RELEASE OF CONFIDENTIAL INFORMATION      2024      Dear Petey,    We are seeing Kimi Bell, date of birth 1985, in the clinic at [facility].   Karmen Kumar DO is the patient's PCP. Kimi Bell has an outstanding lab/procedure at the time we reviewed his chart.  In order to help keep her health information updated, Kimi has authorized us to request the following medical record(s):          Eye Exam - including evaluation for diabetic retinopathy             Please fax any records to Karmen Kumar DO's at  [phone]      If you have any questions you can contact Breanna Che at [phone].             Patient Name: Kimi Bell    : 1985    Patient Phone #: [phone]                                Kimi Bell  MRN: [id number]  : 1985  Age: 38 y.o.  Sex: female         Patient/Legal Guardian Signature  This signature was collected at 2024    Kimi Bell     Self  _______________________________   Printed Name/Relationship to Patient      Consent for Examination and Treatment: I hereby authorize the providers and employees of [facility] ([facility]) to provide medical treatment/services which includes, but is not limited to, performing and administering tests and diagnostic procedures that are deemed necessary, including, but not limited to, imaging examinations, blood tests and other laboratory procedures as may be required by the hospital, clinic, or may be ordered by my physician(s) or persons working under the general and/or special instructions of my physician(s).      I understand and agree that this consent covers all authorized persons, including but not limited to physicians, residents, nurse practitioners, physicians' assistants, specialists, consultants, student nurses, and independently contracted physicians, who are called upon by the physician in charge, to carry out  the diagnostic procedures and medical or surgical treatment.     I hereby authorize [facility] to retain or dispose of any specimens or tissue, should there be such remaining from any test or procedure.     I hereby authorize and give consent for [facility] providers and employees to take photographs, images or videotapes of such diagnostic, surgical or treatment procedures of Patient as may be required by [facility] or as may be ordered by a physician. I further acknowledge and agree that [facility] may use cameras or other devices for patient monitoring.     I am aware that the practice of medicine is not an exact science, and I acknowledge that no guarantees have been made to me as to the outcome of any tests, procedures or treatment.     Authorization for Release of Information: I understand that my insurance company and/or their agents may need information necessary to make determinations about payment/reimbursement. I hereby provide authorization to release to all insurance companies, their successors, assignees, other parties with whom they may have contracted, or others acting on their behalf, that are involved with payment for any hospital and/or clinic charges incurred by the patient, any information that they request and deem necessary for payment/reimbursement, and/or quality review.  I further authorize the release of my health information to physicians or other health care practitioners on staff who are involved in my health care now and in the future, and to other health care providers, entities, or institutions for the purpose of my continued care and treatment, including referrals.     REGISTRATION AUTHORIZATION  Form No. 05354 (Rev. 3/25/2024)    Page 1 of 3                       Medicare Patient's Certification and Authorization to Release Information and Payment Request:  I certify that the information given by me in applying for payment under Title XVIII of the Social Security Act is correct. I authorize  any woods of medical or other information about me to release to the [facility], or its intermediaries or carriers, any information needed for this or a related Medicare claim. I request that payment of authorized benefits be made on my behalf.     Assignment of Insurance Benefits:   I hereby authorize any and all insurance companies, health plans, defined   benefit plans, health insurers or any entity that is or may be responsible for payment of my medical expenses to pay all hospital and medical benefits now due, and to become due and payable to me under any hospital benefits, sick benefits, injury benefits or any other benefit for services rendered to me, including Major Medical Benefits, direct to [facility] and all independently contracted physicians. I assign any and all rights that I may have against any and all insurance companies, health plans, defined benefit plans, health insurers or any entity that is or may be responsible for payment of my medical expenses, including, but not limited to any right to appeal a denial of a claim, any right to bring any action, lawsuit, administrative proceeding, or other cause of action on my behalf. I specifically assign my right to pursue litigation against any and all insurance companies, health plans, defined benefit plans, health insurers or any entity that is or may be responsible for payment of my medical expenses based upon a refusal to pay charges.            E. Valuables: It is understood and agreed that [facility] is not liable for the damage to or loss of any money, jewelry,   documents, dentures, eye glasses, hearing aids, prosthetics, or other property of value.     F. Computer Equipment: I understand and agree that should I choose to use computer equipment owned by [facility] or if I choose to access the Internet via [facility]s network, I do so at my own risk. [facility] is not responsible for any damage to my computer equipment or to any damages of  any type that might arise from my loss of equipment or data.     G. Acceptance of Financial Responsibility:  I agree that in consideration of the services and   supplies that have been   or will be furnished to the patient, I am hereby obligated to pay all charges made for or on the account of the patient according to the standard rates (in effect at the time the services and supplies are delivered) established by [facility], including its Patient Financial Assistance Policy to the extent it is applicable. I understand that I am responsible for all charges, or portions thereof, not covered by insurance or other sources. Patient refunds will be distributed only after balances at all [facility] facilities are paid.     H. Communication Authorization:  I hereby authorize [facility] and its representatives, along with any billing service   or  who may work on their behalf, to contact me on   my cell phone and/or home phone using pre- recorded messages, artificial voice messages, automatic telephone dialing devices or other computer assisted technology, or by electronic      mail, text messaging, or by any other form of electronic communication. This includes, but is not limited to, appointment reminders, yearly physical exam reminders, preventive care reminders, patient campaigns, welcome calls, and calls about account balances on my account or any account on which I am listed as a guarantor. I understand I have the right to opt out of these communications at any time.      Relationship  Between  Facility and  Provider:      I understand that some, but not all, providers furnishing services to the patient are not employees or agents of [facility]. The patient is under the care and supervision of his/her attending physician, and it is the responsibility of the facility and its nursing staff to carry out the instructions of such physicians. It is the responsibility of the patient's physician/designee to obtain the  patient's informed consent, when required, for medical or surgical treatment, special diagnostic or therapeutic procedures, or hospital services rendered for the patient under the special instructions of the physician/designee.           REGISTRATION AUTHORIZATION  Form No. 85120 (Rev. 3/25/2024)    Page 2 of 3                       Immunizations: [facility] shares immunization information with state sponsored health departments to help you and your doctor keep track of your immunization records. By signing, you consent to have this information shared with the health department in your state:                                [place] - LINKS ([place] Immunization Network for Kids Statewide)                                [place] - MIIX ([place] Immunization Information eXchange)                                [place] - ImmPRINT (Immunization Patient Registry with Integrated Technology)     TERM: This authorization is valid for this and subsequent care/treatment I receive at [facility] and will remain valid unless/until revoked in writing by me.     [facility]: As used in this document, [facility] means all [facility] owned and managed facilities, including, but not limited to, all health centers, surgery centers, clinics, urgent care centers, and hospitals.         [facility] complies with applicable Federal civil rights laws and does not discriminate on the basis of race, color, national origin, age, disability, or sex.  ATENCIÓN: si habla josh, tiene a ferro disposición servicios gratuitos de asistencia lingüística. Millicent al [phone].  [facility] Ý: N?u b?n nói Ti?ng Vi?t, có các d?ch v? h? tr? ngôn ng? mi?n phí dành cho b?n. G?i s? [phone].        REGISTRATION AUTHORIZATION  Form No. 49548 (Rev. 3/25/2024)   Page 3 of 3

## 2024-08-22 NOTE — PROGRESS NOTES
Records Received, hyper-linked into chart at this time. The following record(s)  below were uploaded for Health Maintenance .            02/20/2020 DM EYE EXAM  01/19/2017 DM EYE EXAM  11/19/2015 DM EYE EXAM  7/16/2014   DM EYE EXAM

## 2024-09-09 ENCOUNTER — TELEPHONE (OUTPATIENT)
Dept: FAMILY MEDICINE | Facility: CLINIC | Age: 39
End: 2024-09-09
Payer: MEDICARE

## 2024-09-09 NOTE — LETTER
September 10, 2024      [facility]  [address], [address]  [place] [address]  Phone: [phone]       Patient: Kimi Bell   YOB: 1985  Date of Visit: 09/10/2024    To Whom It May Concern:    Zainab Bell  was at [facility] on 09/10/2024. Due to the patient's comorbidities, please excuse the patient from jury duty. Please call our office at [phone] with further questions or concerns.  Sincerely,    Juanita Moyer MA

## 2024-09-09 NOTE — TELEPHONE ENCOUNTER
----- Message from Patricio Willie sent at 9/9/2024 11:44 AM CDT -----  .Who Called: Kimi Bell    Caller is requesting assistance/information from provider's office.    Symptoms (please be specific):    How long has patient had these symptoms:    List of preferred pharmacies on file (remove unneeded):       Preferred Method of Contact: Phone Call  Patient's Preferred Phone Number on File: [phone]   Best Call Back Number, if different:  Additional Information: called requesting to speak with nurse about medical excuse from jury duty.

## 2024-09-10 ENCOUNTER — TELEPHONE (OUTPATIENT)
Dept: FAMILY MEDICINE | Facility: CLINIC | Age: 39
End: 2024-09-10
Payer: MEDICARE

## 2024-09-10 NOTE — TELEPHONE ENCOUNTER
----- Message from Rebecca Quiroz sent at 9/10/2024  9:12 AM CDT -----  .Who Called: Kimi Bell        Preferred Method of Contact: Phone Call  Patient's Preferred Phone Number on File: [phone]   Best Call Back Number, if different:  Additional Information: pt checking on jury duty

## 2024-09-13 DIAGNOSIS — Z79.4 TYPE 2 DIABETES MELLITUS WITH HYPERGLYCEMIA, WITH LONG-TERM CURRENT USE OF INSULIN: ICD-10-CM

## 2024-09-13 DIAGNOSIS — E11.65 TYPE 2 DIABETES MELLITUS WITH HYPERGLYCEMIA, WITH LONG-TERM CURRENT USE OF INSULIN: ICD-10-CM

## 2024-09-13 RX ORDER — LANCETS 33 GAUGE
EACH MISCELLANEOUS
Qty: 100 EACH | Refills: 3 | Status: SHIPPED | OUTPATIENT
Start: 2024-09-13

## 2024-10-03 ENCOUNTER — PATIENT OUTREACH (OUTPATIENT)
Facility: CLINIC | Age: 39
End: 2024-10-03
Payer: MEDICARE

## 2024-10-03 DIAGNOSIS — Z79.4 TYPE 2 DIABETES MELLITUS WITH HYPERGLYCEMIA, WITH LONG-TERM CURRENT USE OF INSULIN: ICD-10-CM

## 2024-10-03 DIAGNOSIS — E11.65 TYPE 2 DIABETES MELLITUS WITH HYPERGLYCEMIA, WITH LONG-TERM CURRENT USE OF INSULIN: ICD-10-CM

## 2024-10-03 DIAGNOSIS — Z09 NEED FOR CASE MANAGEMENT FOLLOW-UP: Primary | ICD-10-CM

## 2024-10-03 NOTE — PROGRESS NOTES
Health Maintenance Topic(s) Outreach Outcomes & Actions Taken:    Eye Exam - Outreach Outcomes & Actions Taken  : Eye Exam Screening Order Placed and Eye Camera Scheduled or Optometry/Ophthalmology Referral Placed/Appt Scheduled    Breast Cancer Screening - Outreach Outcomes & Actions Taken  : Pt Declined Scheduling Mammogram    Cervical Cancer Screening - Outreach Outcomes & Actions Taken  : External Records Requested & Care Team Updated if Applicable       Additional Notes:  FARZANA to Cheryl Carpio for pap smear.          Care Management, Digital Medicine, and/or Education Referrals      Next Steps - Referral Actions: Referral place for OPCM to [facility] for SDOH Food Insecurity & Transportation Needs if applicable

## 2024-10-03 NOTE — LETTER
AUTHORIZATION FOR RELEASE OF   CONFIDENTIAL INFORMATION        We are seeing Kimi Bell, date of birth 1985, in the clinic at [facility]. Karmen Kumar DO is the patient's PCP. Kimi Bell has an outstanding lab/procedure at the time we reviewed her chart. In order to help keep her health information updated, she has authorized us to request the following medical record(s):             (  )  PAP SMEAR                                                 Please fax records to [facility], Nguyen, Mimi T, DO,  at [phone] or email to [email or web address].                Patient Name: Kimi Bell  : 1985  Patient Phone #: [phone]

## 2024-10-15 ENCOUNTER — OUTPATIENT CASE MANAGEMENT (OUTPATIENT)
Dept: ADMINISTRATIVE | Facility: OTHER | Age: 39
End: 2024-10-15
Payer: MEDICARE

## 2024-10-15 NOTE — PROGRESS NOTES
Outpatient Care Management   - Patient Assessment    Patient: Kimi Bell  MRN:  [id number]  Date of Service:  10/15/2024  Completed by:  Alem Michael LMSW  Referral Date: 10/03/2024    Reason for Visit   Patient presents with    Social Work Assessment    Plan Of Care    Case Closure       Brief Summary:  received a referral from outpatient provider for the following Low/Mod SW psychosocial needs Transportation .  The patient also requests assistance with n/a. Care plan was created in collaboration with patient/caregiver input.   completed the SDOH questionnaire.  SW completed assessment with patient. Patient reports residing with family. Patient reports being independent with ADL's and nephew assist with IADL's. Patient denied needing assistance with medication, medical, shelter and utilities and sometimes needs assistance with food. Per chart review ACP document on file. Patient reports transportation is a barrier.

## 2024-11-05 DIAGNOSIS — E11.9 TYPE 2 DIABETES MELLITUS TREATED WITH INSULIN: ICD-10-CM

## 2024-11-05 DIAGNOSIS — E78.2 MIXED HYPERLIPIDEMIA: ICD-10-CM

## 2024-11-05 DIAGNOSIS — Z79.4 TYPE 2 DIABETES MELLITUS TREATED WITH INSULIN: ICD-10-CM

## 2024-11-05 RX ORDER — ATORVASTATIN CALCIUM 20 MG/1
TABLET, FILM COATED ORAL
Qty: 90 TABLET | Refills: 0 | Status: SHIPPED | OUTPATIENT
Start: 2024-11-05

## 2024-11-15 DIAGNOSIS — Z79.4 TYPE 2 DIABETES MELLITUS WITH HYPERGLYCEMIA, WITH LONG-TERM CURRENT USE OF INSULIN: ICD-10-CM

## 2024-11-15 DIAGNOSIS — E11.65 TYPE 2 DIABETES MELLITUS WITH HYPERGLYCEMIA, WITH LONG-TERM CURRENT USE OF INSULIN: ICD-10-CM

## 2024-12-02 DIAGNOSIS — Z79.4 TYPE 2 DIABETES MELLITUS TREATED WITH INSULIN: ICD-10-CM

## 2024-12-02 DIAGNOSIS — E11.9 TYPE 2 DIABETES MELLITUS TREATED WITH INSULIN: ICD-10-CM

## 2024-12-02 DIAGNOSIS — E78.2 MIXED HYPERLIPIDEMIA: ICD-10-CM

## 2024-12-02 RX ORDER — ATORVASTATIN CALCIUM 20 MG/1
TABLET, FILM COATED ORAL
Qty: 90 TABLET | Refills: 0 | Status: SHIPPED | OUTPATIENT
Start: 2024-12-02

## 2025-02-03 ENCOUNTER — OFFICE VISIT (OUTPATIENT)
Dept: FAMILY MEDICINE | Facility: CLINIC | Age: 40
End: 2025-02-03
Payer: MEDICARE

## 2025-02-03 ENCOUNTER — LAB VISIT (OUTPATIENT)
Dept: LAB | Facility: HOSPITAL | Age: 40
End: 2025-02-03
Attending: STUDENT IN AN ORGANIZED HEALTH CARE EDUCATION/TRAINING PROGRAM
Payer: MEDICARE

## 2025-02-03 VITALS
RESPIRATION RATE: 18 BRPM | DIASTOLIC BLOOD PRESSURE: 74 MMHG | BODY MASS INDEX: 59.07 KG/M2 | TEMPERATURE: 98 F | SYSTOLIC BLOOD PRESSURE: 118 MMHG | HEART RATE: 90 BPM | WEIGHT: 293 LBS | OXYGEN SATURATION: 98 % | HEIGHT: 59 IN

## 2025-02-03 DIAGNOSIS — E11.65 TYPE 2 DIABETES MELLITUS WITH HYPERGLYCEMIA, WITH LONG-TERM CURRENT USE OF INSULIN: Primary | ICD-10-CM

## 2025-02-03 DIAGNOSIS — J30.9 ALLERGIC RHINITIS, UNSPECIFIED SEASONALITY, UNSPECIFIED TRIGGER: ICD-10-CM

## 2025-02-03 DIAGNOSIS — E11.65 TYPE 2 DIABETES MELLITUS WITH HYPERGLYCEMIA, WITH LONG-TERM CURRENT USE OF INSULIN: ICD-10-CM

## 2025-02-03 DIAGNOSIS — E03.9 HYPOTHYROIDISM, UNSPECIFIED TYPE: ICD-10-CM

## 2025-02-03 DIAGNOSIS — E78.2 MIXED HYPERLIPIDEMIA: ICD-10-CM

## 2025-02-03 DIAGNOSIS — F41.1 GENERALIZED ANXIETY DISORDER: ICD-10-CM

## 2025-02-03 DIAGNOSIS — I10 PRIMARY HYPERTENSION: ICD-10-CM

## 2025-02-03 DIAGNOSIS — Z79.4 TYPE 2 DIABETES MELLITUS WITH HYPERGLYCEMIA, WITH LONG-TERM CURRENT USE OF INSULIN: Primary | ICD-10-CM

## 2025-02-03 DIAGNOSIS — F20.9 SCHIZOPHRENIA, UNSPECIFIED TYPE: ICD-10-CM

## 2025-02-03 DIAGNOSIS — Z12.31 ENCOUNTER FOR SCREENING MAMMOGRAM FOR MALIGNANT NEOPLASM OF BREAST: ICD-10-CM

## 2025-02-03 DIAGNOSIS — E55.9 VITAMIN D DEFICIENCY: ICD-10-CM

## 2025-02-03 DIAGNOSIS — D50.9 IRON DEFICIENCY ANEMIA, UNSPECIFIED IRON DEFICIENCY ANEMIA TYPE: ICD-10-CM

## 2025-02-03 DIAGNOSIS — G47.33 OBSTRUCTIVE SLEEP APNEA SYNDROME: ICD-10-CM

## 2025-02-03 DIAGNOSIS — Z79.4 TYPE 2 DIABETES MELLITUS WITH HYPERGLYCEMIA, WITH LONG-TERM CURRENT USE OF INSULIN: ICD-10-CM

## 2025-02-03 DIAGNOSIS — G47.00 INSOMNIA, UNSPECIFIED TYPE: ICD-10-CM

## 2025-02-03 LAB
25(OH)D3+25(OH)D2 SERPL-MCNC: 34 NG/ML (ref 30–80)
ABS NEUT CALC (OHS): 7.62 X10(3)/MCL (ref 2.1–9.2)
ALBUMIN SERPL-MCNC: 3.7 G/DL (ref 3.5–5)
ALBUMIN/GLOB SERPL: 0.8 RATIO (ref 1.1–2)
ALP SERPL-CCNC: 99 UNIT/L (ref 40–150)
ALT SERPL-CCNC: 70 UNIT/L (ref 0–55)
ANION GAP SERPL CALC-SCNC: 13 MEQ/L
AST SERPL-CCNC: 71 UNIT/L (ref 5–34)
BILIRUB SERPL-MCNC: 0.9 MG/DL
BUN SERPL-MCNC: 13.6 MG/DL (ref 7–18.7)
CALCIUM SERPL-MCNC: 9.3 MG/DL (ref 8.4–10.2)
CHLORIDE SERPL-SCNC: 98 MMOL/L (ref 98–107)
CHOLEST SERPL-MCNC: 142 MG/DL
CHOLEST/HDLC SERPL: 4 {RATIO} (ref 0–5)
CO2 SERPL-SCNC: 26 MMOL/L (ref 22–29)
CREAT SERPL-MCNC: 1.13 MG/DL (ref 0.55–1.02)
CREAT/UREA NIT SERPL: 12
ERYTHROCYTE [DISTWIDTH] IN BLOOD BY AUTOMATED COUNT: 12.6 % (ref 11.5–17)
EST. AVERAGE GLUCOSE BLD GHB EST-MCNC: 292 MG/DL
GFR SERPLBLD CREATININE-BSD FMLA CKD-EPI: >60 ML/MIN/1.73/M2
GLOBULIN SER-MCNC: 4.7 GM/DL (ref 2.4–3.5)
GLUCOSE SERPL-MCNC: 268 MG/DL (ref 74–100)
HBA1C MFR BLD: 11.8 %
HCT VFR BLD AUTO: 39.7 % (ref 37–47)
HDLC SERPL-MCNC: 40 MG/DL (ref 35–60)
HGB BLD-MCNC: 12.5 G/DL (ref 12–16)
LDLC SERPL CALC-MCNC: 72 MG/DL (ref 50–140)
LYMPHOCYTES NFR BLD MANUAL: 40 % (ref 13–40)
LYMPHOCYTES NFR BLD MANUAL: 5.75 X10(3)/MCL
MCH RBC QN AUTO: 29.8 PG (ref 27–31)
MCHC RBC AUTO-ENTMCNC: 31.5 G/DL (ref 33–36)
MCV RBC AUTO: 94.5 FL (ref 80–94)
MONOCYTES NFR BLD MANUAL: 1.01 X10(3)/MCL (ref 0.1–1.3)
MONOCYTES NFR BLD MANUAL: 7 % (ref 2–11)
NEUTROPHILS NFR BLD MANUAL: 53 % (ref 47–80)
NRBC BLD AUTO-RTO: 0 %
PLATELET # BLD AUTO: 432 X10(3)/MCL (ref 130–400)
PLATELET # BLD EST: ABNORMAL 10*3/UL
PMV BLD AUTO: 9 FL (ref 7.4–10.4)
POTASSIUM SERPL-SCNC: 4.1 MMOL/L (ref 3.5–5.1)
PROT SERPL-MCNC: 8.4 GM/DL (ref 6.4–8.3)
RBC # BLD AUTO: 4.2 X10(6)/MCL (ref 4.2–5.4)
RBC MORPH BLD: NORMAL
SODIUM SERPL-SCNC: 137 MMOL/L (ref 136–145)
T4 FREE SERPL-MCNC: 1.18 NG/DL (ref 0.7–1.48)
TRIGL SERPL-MCNC: 150 MG/DL (ref 37–140)
TSH SERPL-ACNC: 5.49 UIU/ML (ref 0.35–4.94)
VLDLC SERPL CALC-MCNC: 30 MG/DL
WBC # BLD AUTO: 14.37 X10(3)/MCL (ref 4.5–11.5)

## 2025-02-03 PROCEDURE — 83036 HEMOGLOBIN GLYCOSYLATED A1C: CPT

## 2025-02-03 PROCEDURE — 36415 COLL VENOUS BLD VENIPUNCTURE: CPT

## 2025-02-03 PROCEDURE — 80053 COMPREHEN METABOLIC PANEL: CPT

## 2025-02-03 PROCEDURE — 85025 COMPLETE CBC W/AUTO DIFF WBC: CPT

## 2025-02-03 PROCEDURE — 84443 ASSAY THYROID STIM HORMONE: CPT

## 2025-02-03 PROCEDURE — 80061 LIPID PANEL: CPT

## 2025-02-03 PROCEDURE — 82306 VITAMIN D 25 HYDROXY: CPT

## 2025-02-03 PROCEDURE — 84439 ASSAY OF FREE THYROXINE: CPT

## 2025-02-03 PROCEDURE — 99214 OFFICE O/P EST MOD 30 MIN: CPT | Mod: ,,, | Performed by: STUDENT IN AN ORGANIZED HEALTH CARE EDUCATION/TRAINING PROGRAM

## 2025-02-03 RX ORDER — INSULIN ASPART 100 [IU]/ML
40 INJECTION, SOLUTION INTRAVENOUS; SUBCUTANEOUS 2 TIMES DAILY WITH MEALS
Start: 2025-02-03

## 2025-02-03 NOTE — PROGRESS NOTES
Subjective:      Patient ID: Kimi Bell is a 39 y.o.  female.    Chief Complaint: Chronic Medical Management    NIDDMII: Patient not checking her blood sugars consistently. She says her sugars are ranging 170-260. Patient following with [facility] for her eye exams. Patient taking Novolog 40U BID, Janumet XR BID, Amaryl daily, and Glargine 18U nightly. Patient taking Ozempic on Sundays. She is following with Diabetes Education.    HTN/HLD: /74. Patient taking Losartan, Metoprolol, and Lipitor.    Hypothyroidism: Patient states she used to be on thyroid medication, but this was discontinued due to stable thyroid numbers.    Schizophrenia/Anxiety/Insomnia: Patient following with Psychiatry. Patient taking Abilify, Benztropine, and Effexor.    THEODORE: Patient would like a new referral to Neurology due to location. She states compliance with CPAP nightly.    Iron Deficiency Anemia: Patient states compliance with iron supplementation during her menstrual cycles.    Vitamin D Deficiency: Patient states compliance with vitamin D supplementation daily.    Allergic Rhinitis: Patient taking Zyrtec and Flonase.      Elevated AST/ALT: Patient denies any alcohol or Tylenol use.      Preventative Health: Medicare Wellness completed on 01/02/24. Patient amenable to mammogram order.    Review of Systems   Constitutional:  Negative for activity change, appetite change, chills, diaphoresis, fatigue, fever and unexpected weight change.   Eyes:  Negative for visual disturbance.   Respiratory:  Negative for apnea, cough, shortness of breath, wheezing and stridor.    Cardiovascular:  Negative for chest pain, palpitations and leg swelling.   Gastrointestinal:  Negative for abdominal pain, blood in stool, constipation, diarrhea, nausea and vomiting.   Genitourinary:  Negative for dysuria and hematuria.   Musculoskeletal:  Negative for arthralgias, back pain, gait problem and myalgias.   Skin:  Negative  "for rash and wound.   Allergic/Immunologic: Positive for environmental allergies.   Neurological:  Negative for dizziness, syncope, weakness, numbness and headaches.   Psychiatric/Behavioral:  Positive for dysphoric mood. Negative for behavioral problems, self-injury, sleep disturbance and suicidal ideas. The patient is not nervous/anxious.      Objective:   /74 (BP Location: Right forearm, Patient Position: Sitting)   Pulse 90   Temp 98.2 °F (36.8 °C) (Oral)   Resp 18   Ht 4' 11" (1.499 m)   Wt (!) 148 kg (326 lb 4.8 oz)   SpO2 98%   BMI 65.90 kg/m²     Physical Exam  Vitals and nursing note reviewed.   Constitutional:       General: She is not in acute distress.     Appearance: Normal appearance. She is obese. She is not ill-appearing or toxic-appearing.   HENT:      Head: Normocephalic and atraumatic.      Mouth/Throat:      Mouth: Mucous membranes are moist.      Pharynx: Oropharynx is clear. No oropharyngeal exudate or posterior oropharyngeal erythema.   Eyes:      Conjunctiva/sclera: Conjunctivae normal.   Cardiovascular:      Rate and Rhythm: Normal rate and regular rhythm.      Heart sounds: Normal heart sounds. No murmur heard.  Pulmonary:      Effort: Pulmonary effort is normal. No respiratory distress.      Breath sounds: Normal breath sounds.   Abdominal:      General: There is no distension.      Palpations: Abdomen is soft.      Tenderness: There is no abdominal tenderness.   Musculoskeletal:         General: No deformity.      Right lower leg: No edema.      Left lower leg: No edema.   Skin:     General: Skin is warm and dry.      Findings: No lesion or rash.   Neurological:      General: No focal deficit present.      Mental Status: She is alert. Mental status is at baseline.      Motor: No weakness.      Coordination: Coordination normal.   Psychiatric:         Mood and Affect: Mood normal.         Behavior: Behavior normal.         Thought Content: Thought content normal.         " Judgment: Judgment normal.       Assessment/Plan:   1. Type 2 diabetes mellitus with hyperglycemia, with long-term current use of insulin  Assessment & Plan:  - Labs for routine monitoring.  - Patient following with Diabetes Education.  - Patient following with [facility] for eye exams.  - Will need to infer about diabetic foot exam at follow-up.  - Continue Lantus 18U nightly and Novolog 40U BID. Patient will continue to titrate her insulin regimen based on blood sugars and contact the clinic next week with readings/insulin regimen.  - Continue Janumet XR 50mg-1000mg BID, Amaryl 4mg daily, and Ozempic 1mg weekly.    Orders:  -     CBC Auto Differential; Future; Expected date: 02/03/2025  -     Comprehensive Metabolic Panel; Future; Expected date: 02/03/2025  -     Hemoglobin A1C; Future; Expected date: 02/03/2025  -     Microalbumin/Creatinine Ratio, Urine; Future; Expected date: 02/03/2025  -     insulin aspart U-100 (NOVOLOG) 100 unit/mL (3 mL) InPn pen; Inject 40 Units into the skin 2 (two) times daily with meals.    2. Primary hypertension  Assessment & Plan:  - BP well-controlled.  - Continue Losartan 50mg daily and Metoprolol 50mg BID.    Orders:  -     CBC Auto Differential; Future; Expected date: 02/03/2025  -     Comprehensive Metabolic Panel; Future; Expected date: 02/03/2025  -     Microalbumin/Creatinine Ratio, Urine; Future; Expected date: 02/03/2025    3. Mixed hyperlipidemia  Assessment & Plan:  - Continue Lipitor 20mg daily.    Orders:  -     Lipid Panel; Future; Expected date: 02/03/2025    4. Hypothyroidism, unspecified type  Assessment & Plan:  - Thyroid labs for routine monitoring.  - Continue to monitor.    Orders:  -     TSH; Future; Expected date: 02/03/2025    5. Schizophrenia, unspecified type  Assessment & Plan:  - Stable.  - Patient following with Psychiatry.  - Abilify 30mg daily, Benztropine 1mg BID, and Effexor XR 140mg daily per Psychiatry.      6. Generalized anxiety  disorder  Assessment & Plan:  - Refer to Schizophrenia plan.      7. Insomnia, unspecified type  Assessment & Plan:  - Refer to Schizophrenia plan.      8. Obstructive sleep apnea syndrome  Assessment & Plan:  - Stable.  - Patient following with Neurology; however, she is asking for a Neurology referral in [place] due to [place] being far for her.  - Patient states compliance with CPAP nightly.    Orders:  -     Ambulatory referral/consult to Sleep Disorders; Future; Expected date: 02/10/2025    9. Iron deficiency anemia, unspecified iron deficiency anemia type  Assessment & Plan:  - Continue iron supplementation.      10. Vitamin D deficiency  Assessment & Plan:  - Vitamin D level for routine monitoring.  - Recommend vitamin D supplementation daily OTC.    Orders:  -     Vitamin D; Future; Expected date: 02/03/2025    11. Allergic rhinitis, unspecified seasonality, unspecified trigger  Assessment & Plan:  - Stable.  - Continue Zyrtec 10mg daily and Flonase OTC.      12. Encounter for screening mammogram for malignant neoplasm of breast  -     Mammo Digital Screening Bilat w/ Rosalino; Future; Expected date: 02/03/2025       Follow up in about 3 months (around 5/3/2025) for Medicare Wellness.

## 2025-02-03 NOTE — ASSESSMENT & PLAN NOTE
- Stable.  - Patient following with Neurology; however, she is asking for a Neurology referral in [place] due to [place] being far for her.  - Patient states compliance with CPAP nightly.

## 2025-02-03 NOTE — ASSESSMENT & PLAN NOTE
- Labs for routine monitoring.  - Patient following with Diabetes Education.  - Patient following with [facility] for eye exams.  - Will need to infer about diabetic foot exam at follow-up.  - Continue Lantus 18U nightly and Novolog 40U BID. Patient will continue to titrate her insulin regimen based on blood sugars and contact the clinic next week with readings/insulin regimen.  - Continue Janumet XR 50mg-1000mg BID, Amaryl 4mg daily, and Ozempic 1mg weekly.

## 2025-02-04 ENCOUNTER — TELEPHONE (OUTPATIENT)
Dept: FAMILY MEDICINE | Facility: CLINIC | Age: 40
End: 2025-02-04
Payer: MEDICARE

## 2025-02-04 DIAGNOSIS — D53.9 MACROCYTIC ANEMIA: ICD-10-CM

## 2025-02-04 DIAGNOSIS — E03.8 SUBCLINICAL HYPOTHYROIDISM: Primary | ICD-10-CM

## 2025-02-04 DIAGNOSIS — E03.9 HYPOTHYROIDISM, UNSPECIFIED TYPE: ICD-10-CM

## 2025-02-04 DIAGNOSIS — R79.89 ELEVATED SERUM CREATININE: ICD-10-CM

## 2025-02-04 DIAGNOSIS — R74.01 TRANSAMINITIS: ICD-10-CM

## 2025-02-04 RX ORDER — LEVOTHYROXINE SODIUM 200 UG/1
200 TABLET ORAL
Qty: 90 TABLET | Refills: 0 | Status: SHIPPED | OUTPATIENT
Start: 2025-02-04 | End: 2026-02-04

## 2025-02-04 NOTE — TELEPHONE ENCOUNTER
----- Message from Meghan sent at 2/4/2025  8:30 AM CST -----  Who Called: Kimi Bell    Caller is requesting information on test results.    Name of Test (Lab/Mammo/Etc): Labs    Date of Test: 02/03/25    Where the test was performed: [facility]    Ordering Provider: Stacy    Preferred Method of Contact: Phone Call    Patient's Preferred Phone Number on File: [phone]     Best Call Back Number, if different:    Additional Information: pt called to inquire about test results for yesterday's labs

## 2025-02-06 DIAGNOSIS — E11.65 TYPE 2 DIABETES MELLITUS WITH HYPERGLYCEMIA, WITH LONG-TERM CURRENT USE OF INSULIN: ICD-10-CM

## 2025-02-06 DIAGNOSIS — Z79.4 TYPE 2 DIABETES MELLITUS WITH HYPERGLYCEMIA, WITH LONG-TERM CURRENT USE OF INSULIN: ICD-10-CM

## 2025-02-06 DIAGNOSIS — I10 PRIMARY HYPERTENSION: ICD-10-CM

## 2025-02-06 RX ORDER — METOPROLOL TARTRATE 50 MG/1
50 TABLET ORAL 2 TIMES DAILY
Qty: 180 TABLET | Refills: 3 | Status: SHIPPED | OUTPATIENT
Start: 2025-02-06

## 2025-02-06 RX ORDER — SITAGLIPTIN AND METFORMIN HYDROCHLORIDE 1000; 50 MG/1; MG/1
TABLET, FILM COATED ORAL
Qty: 180 TABLET | Refills: 3 | Status: SHIPPED | OUTPATIENT
Start: 2025-02-06

## 2025-02-10 ENCOUNTER — TELEPHONE (OUTPATIENT)
Dept: FAMILY MEDICINE | Facility: CLINIC | Age: 40
End: 2025-02-10
Payer: MEDICARE

## 2025-02-10 NOTE — TELEPHONE ENCOUNTER
Pt notified lab orders are in chart.   Pt verbalized understanding.  Pt stated she will complete lab work on 2-.

## 2025-02-10 NOTE — TELEPHONE ENCOUNTER
Pt instructed per Dr. Kumar-    Please instruct patient to administer 22U of Semglee nightly and monitor her fasting blood sugars and contact the clinic in 1 week with readings.    Pt verbalized understanding.

## 2025-02-10 NOTE — TELEPHONE ENCOUNTER
----- Message from Arcelia sent at 2/10/2025  9:12 AM CST -----  Who Called: Kimi Bell    Caller is requesting assistance/information from provider's office.    Symptoms (please be specific):     How long has patient had these symptoms:     List of preferred pharmacies on file (remove unneeded): [unfilled]  If different, enter pharmacy into here including location and phone number:        Preferred Method of Contact: Phone Call  Patient's Preferred Phone Number on File: [phone]   Best Call Back Number, if different: [phone]  Additional Information:  pt would like to leave her blood sugar  results /  feb 4  [id number]    232 NOON  feb 5 Morning  233 ,208 NoonTime  Feb6 Morning 168 , 230  NOON time  feb 7 Morning 137 ,189 Noon time Feb 8 Morning  171   119 Noon time Feb 9 144 165 Noontime feb 10 Morning  208

## 2025-02-10 NOTE — TELEPHONE ENCOUNTER
t would like to leave her blood sugar  results /  feb 4  [id number]    232 NOON  feb 5 Morning  233 ,208 NoonTime  Feb6 Morning 168 , 230  NOON time  feb 7 Morning 137 ,189 Noon time Feb 8 Morning  171   119 Noon time Feb 9 144 165 Noontime feb 10 Morning  208

## 2025-02-10 NOTE — TELEPHONE ENCOUNTER
Patient's fasting blood sugars are still not at goal which would be . How much Semglee is she administering nightly currently?

## 2025-02-10 NOTE — TELEPHONE ENCOUNTER
----- Message from Arcelia sent at 2/10/2025  9:17 AM CST -----  Who Called: Kimi Bell    What order is the patient requesting:      When does the expect the orders to be performed?          Preferred Method of Contact: Phone Call  Patient's Preferred Phone Number on File: [phone]   Best Call Back Number, if different:  Additional Information:  requesting lab

## 2025-02-10 NOTE — TELEPHONE ENCOUNTER
Please instruct patient to administer 22U of Semglee nightly and monitor her fasting blood sugars and contact the clinic in 1 week with readings.

## 2025-02-17 ENCOUNTER — TELEPHONE (OUTPATIENT)
Dept: FAMILY MEDICINE | Facility: CLINIC | Age: 40
End: 2025-02-17
Payer: MEDICARE

## 2025-02-17 NOTE — TELEPHONE ENCOUNTER
Patient's blood sugars are still not at goal. She will need to increase insulin regimen by 2U nightly and continue to monitor fasting blood sugars/blood sugars and contact the clinic in 1 week with readings.

## 2025-02-17 NOTE — TELEPHONE ENCOUNTER
----- Message from Arcelia sent at 2/17/2025 10:01 AM CST -----  Who Called: Kimi Carballo is returning phone callWho Left Message for Patient: Does the patient know what this is regarding?: Preferred Method of Contact: Phone CallPatient's Preferred Phone Number on File: [phone] Best Call Back Number, if different:[phone] Information:  returning call back

## 2025-02-17 NOTE — TELEPHONE ENCOUNTER
----- Message from Patricio sent at 2/17/2025  9:13 AM CST -----  .Who Called: Kimi Lynn is requesting assistance/information from provider's office.Symptoms (please be specific):  How long has patient had these symptoms:  List of preferred pharmacies on file (remove unneeded): [unfilled]If different, enter pharmacy into here including location and phone number: Preferred Method of Contact: Phone CallPatient's Preferred Phone Number on File: [phone] Best Call Back Number, if different:Additional Information:  pt called to report Blood Glucose Levels 02/10 , 02/11 AM: 226 PM: 152, 02/12 AM: 145 PM: 173, 02/13 AM: 240 PM: 291, 02/14 AM: 143 PM: 271, 02/15 AM: 190 PM: 191, 02/16 AM: 186 PM: 194, 02/17 AM: 182

## 2025-02-18 ENCOUNTER — TELEPHONE (OUTPATIENT)
Dept: FAMILY MEDICINE | Facility: CLINIC | Age: 40
End: 2025-02-18
Payer: MEDICARE

## 2025-02-18 NOTE — TELEPHONE ENCOUNTER
----- Message from Meme sent at 2/18/2025  9:27 AM CST -----  .Type:  Patient Returning CallWho Called:ptWho Left Message for Patient:ptDoes the patient know what this is regarding?:labs Would the patient rather a call back or a response via [facility]? [facility] Call Back Number:[phone] or [phone] Additional Information: Please call back about doing labs now

## 2025-02-19 ENCOUNTER — RESULTS FOLLOW-UP (OUTPATIENT)
Dept: FAMILY MEDICINE | Facility: CLINIC | Age: 40
End: 2025-02-19
Payer: MEDICARE

## 2025-02-19 ENCOUNTER — LAB VISIT (OUTPATIENT)
Dept: LAB | Facility: HOSPITAL | Age: 40
End: 2025-02-19
Attending: STUDENT IN AN ORGANIZED HEALTH CARE EDUCATION/TRAINING PROGRAM
Payer: MEDICARE

## 2025-02-19 DIAGNOSIS — E03.8 SUBCLINICAL HYPOTHYROIDISM: Primary | ICD-10-CM

## 2025-02-19 DIAGNOSIS — D53.9 MACROCYTIC ANEMIA: ICD-10-CM

## 2025-02-19 DIAGNOSIS — R79.89 ELEVATED SERUM CREATININE: ICD-10-CM

## 2025-02-19 DIAGNOSIS — E03.9 HYPOTHYROIDISM, UNSPECIFIED TYPE: ICD-10-CM

## 2025-02-19 DIAGNOSIS — R74.01 TRANSAMINITIS: ICD-10-CM

## 2025-02-19 DIAGNOSIS — R59.0 LYMPHADENOPATHY, AXILLARY: Primary | ICD-10-CM

## 2025-02-19 LAB
ALBUMIN SERPL-MCNC: 3.6 G/DL (ref 3.5–5)
ALBUMIN/GLOB SERPL: 0.7 RATIO (ref 1.1–2)
ALP SERPL-CCNC: 94 UNIT/L (ref 40–150)
ALT SERPL-CCNC: 90 UNIT/L (ref 0–55)
ANION GAP SERPL CALC-SCNC: 12 MEQ/L
AST SERPL-CCNC: 85 UNIT/L (ref 5–34)
BACTERIA #/AREA URNS AUTO: ABNORMAL /HPF
BASOPHILS # BLD AUTO: 0.05 X10(3)/MCL
BASOPHILS NFR BLD AUTO: 0.4 %
BILIRUB SERPL-MCNC: 0.7 MG/DL
BILIRUB UR QL STRIP.AUTO: NEGATIVE
BUN SERPL-MCNC: 15.8 MG/DL (ref 7–18.7)
CALCIUM SERPL-MCNC: 9.9 MG/DL (ref 8.4–10.2)
CHLORIDE SERPL-SCNC: 102 MMOL/L (ref 98–107)
CLARITY UR: ABNORMAL
CO2 SERPL-SCNC: 25 MMOL/L (ref 22–29)
COLOR UR AUTO: ABNORMAL
CREAT SERPL-MCNC: 0.87 MG/DL (ref 0.55–1.02)
CREAT UR-MCNC: 123.7 MG/DL (ref 45–106)
CREAT/UREA NIT SERPL: 18
EOSINOPHIL # BLD AUTO: 0.09 X10(3)/MCL (ref 0–0.9)
EOSINOPHIL NFR BLD AUTO: 0.7 %
ERYTHROCYTE [DISTWIDTH] IN BLOOD BY AUTOMATED COUNT: 13.2 % (ref 11.5–17)
FERRITIN SERPL-MCNC: 129.46 NG/ML (ref 4.63–204)
FOLATE SERPL-MCNC: 11.9 NG/ML (ref 7–31.4)
GFR SERPLBLD CREATININE-BSD FMLA CKD-EPI: >60 ML/MIN/1.73/M2
GLOBULIN SER-MCNC: 4.9 GM/DL (ref 2.4–3.5)
GLUCOSE SERPL-MCNC: 172 MG/DL (ref 74–100)
GLUCOSE UR QL STRIP: NEGATIVE
HBV CORE AB SERPL QL IA: NONREACTIVE
HBV SURFACE AB SER-ACNC: 0.9 MIU/ML
HBV SURFACE AB SERPL IA-ACNC: NONREACTIVE M[IU]/ML
HBV SURFACE AG SERPL QL IA: NONREACTIVE
HCT VFR BLD AUTO: 37.5 % (ref 37–47)
HGB BLD-MCNC: 11.7 G/DL (ref 12–16)
HGB UR QL STRIP: NEGATIVE
IMM GRANULOCYTES # BLD AUTO: 0.09 X10(3)/MCL (ref 0–0.04)
IMM GRANULOCYTES NFR BLD AUTO: 0.7 %
IRON SATN MFR SERPL: 20 % (ref 20–50)
IRON SERPL-MCNC: 59 UG/DL (ref 50–170)
KETONES UR QL STRIP: NEGATIVE
LEUKOCYTE ESTERASE UR QL STRIP: ABNORMAL
LYMPHOCYTES # BLD AUTO: 4.19 X10(3)/MCL (ref 0.6–4.6)
LYMPHOCYTES NFR BLD AUTO: 30.8 %
MCH RBC QN AUTO: 29.2 PG (ref 27–31)
MCHC RBC AUTO-ENTMCNC: 31.2 G/DL (ref 33–36)
MCV RBC AUTO: 93.5 FL (ref 80–94)
MICROALBUMIN UR-MCNC: 29 UG/ML
MICROALBUMIN/CREAT RATIO PNL UR: 23.4 MG/GM CR (ref 0–30)
MONOCYTES # BLD AUTO: 0.57 X10(3)/MCL (ref 0.1–1.3)
MONOCYTES NFR BLD AUTO: 4.2 %
NEUTROPHILS # BLD AUTO: 8.61 X10(3)/MCL (ref 2.1–9.2)
NEUTROPHILS NFR BLD AUTO: 63.2 %
NITRITE UR QL STRIP: POSITIVE
NRBC BLD AUTO-RTO: 0 %
PH UR STRIP: 6 [PH]
PLATELET # BLD AUTO: 454 X10(3)/MCL (ref 130–400)
PMV BLD AUTO: 8.7 FL (ref 7.4–10.4)
POTASSIUM SERPL-SCNC: 3.9 MMOL/L (ref 3.5–5.1)
PROT SERPL-MCNC: 8.5 GM/DL (ref 6.4–8.3)
PROT UR QL STRIP: NEGATIVE
RBC # BLD AUTO: 4.01 X10(6)/MCL (ref 4.2–5.4)
RBC #/AREA URNS AUTO: ABNORMAL /HPF
SODIUM SERPL-SCNC: 139 MMOL/L (ref 136–145)
SP GR UR STRIP.AUTO: 1.02 (ref 1–1.03)
SQUAMOUS #/AREA URNS AUTO: ABNORMAL /HPF
T3FREE SERPL-MCNC: 3.44 PG/ML (ref 1.58–3.91)
TIBC SERPL-MCNC: 236 UG/DL (ref 70–310)
TIBC SERPL-MCNC: 295 UG/DL (ref 250–450)
TRANSFERRIN SERPL-MCNC: 257 MG/DL (ref 180–382)
UROBILINOGEN UR STRIP-ACNC: 0.2
VIT B12 SERPL-MCNC: 403 PG/ML (ref 213–816)
WBC # BLD AUTO: 13.6 X10(3)/MCL (ref 4.5–11.5)
WBC #/AREA URNS AUTO: ABNORMAL /HPF

## 2025-02-19 PROCEDURE — 86704 HEP B CORE ANTIBODY TOTAL: CPT

## 2025-02-19 PROCEDURE — 86039 ANTINUCLEAR ANTIBODIES (ANA): CPT

## 2025-02-19 PROCEDURE — 85025 COMPLETE CBC W/AUTO DIFF WBC: CPT

## 2025-02-19 PROCEDURE — 83540 ASSAY OF IRON: CPT

## 2025-02-19 PROCEDURE — 82607 VITAMIN B-12: CPT

## 2025-02-19 PROCEDURE — 87340 HEPATITIS B SURFACE AG IA: CPT

## 2025-02-19 PROCEDURE — 86706 HEP B SURFACE ANTIBODY: CPT

## 2025-02-19 PROCEDURE — 80053 COMPREHEN METABOLIC PANEL: CPT

## 2025-02-19 PROCEDURE — 36415 COLL VENOUS BLD VENIPUNCTURE: CPT

## 2025-02-19 PROCEDURE — 86015 ACTIN ANTIBODY EACH: CPT

## 2025-02-19 PROCEDURE — 82746 ASSAY OF FOLIC ACID SERUM: CPT

## 2025-02-19 PROCEDURE — 82728 ASSAY OF FERRITIN: CPT

## 2025-02-19 PROCEDURE — 87077 CULTURE AEROBIC IDENTIFY: CPT

## 2025-02-19 PROCEDURE — 81003 URINALYSIS AUTO W/O SCOPE: CPT

## 2025-02-19 PROCEDURE — 82390 ASSAY OF CERULOPLASMIN: CPT

## 2025-02-19 PROCEDURE — 84481 FREE ASSAY (FT-3): CPT

## 2025-02-21 ENCOUNTER — TELEPHONE (OUTPATIENT)
Dept: FAMILY MEDICINE | Facility: CLINIC | Age: 40
End: 2025-02-21
Payer: MEDICARE

## 2025-02-21 LAB — VIEW PATHOLOGY REPORT (RELIAPATH): NORMAL

## 2025-02-21 NOTE — TELEPHONE ENCOUNTER
----- Message from Chary sent at 2/21/2025  8:15 AM CST -----  Who Called: Kimi Lynn is requesting assistance/information from provider's office.Symptoms (please be specific):  How long has patient had these symptoms:  List of preferred pharmacies on file (remove unneeded): [unfilled]If different, enter pharmacy into here including location and phone number: Patient's Preferred Phone Number on File: [phone] Best Call Back Number, if different: [phone] Information: pt called to discuss lab results , please follow up

## 2025-02-22 LAB
ANA PAT SER IF-IMP: NORMAL
ANA PAT SER IF-IMP: NORMAL
ANA SER QL HEP2 SUBST: NORMAL
ANA TITR SER HEP2 SUBST: NORMAL {TITER}
ANA TITR SER HEP2 SUBST: NORMAL {TITER}
BACTERIA UR CULT: ABNORMAL
CERULOPLASMIN SERPL-MCNC: NORMAL MG/DL
CYTOPLASMIC AB PATTERN SER IF-IMP: NORMAL
LABORATORY COMMENT REPORT: NORMAL

## 2025-02-24 ENCOUNTER — TELEPHONE (OUTPATIENT)
Dept: FAMILY MEDICINE | Facility: CLINIC | Age: 40
End: 2025-02-24
Payer: MEDICARE

## 2025-02-24 ENCOUNTER — RESULTS FOLLOW-UP (OUTPATIENT)
Dept: FAMILY MEDICINE | Facility: CLINIC | Age: 40
End: 2025-02-24
Payer: MEDICARE

## 2025-02-24 DIAGNOSIS — N30.90 CYSTITIS: Primary | ICD-10-CM

## 2025-02-24 LAB — SMOOTH MUSCLE AB SER QL IF: NEGATIVE

## 2025-02-24 RX ORDER — NITROFURANTOIN 25; 75 MG/1; MG/1
100 CAPSULE ORAL 2 TIMES DAILY
Qty: 10 CAPSULE | Refills: 0 | Status: SHIPPED | OUTPATIENT
Start: 2025-02-24 | End: 2025-03-01

## 2025-02-24 NOTE — TELEPHONE ENCOUNTER
----- Message from Chary sent at 2/24/2025  9:00 AM CST -----  Who Called: Kimi Lynn is requesting assistance/information from provider's office.Symptoms (please be specific):  How long has patient had these symptoms:  List of preferred pharmacies on file (remove unneeded): [unfilled]If different, enter pharmacy into here including location and phone number: Patient's Preferred Phone Number on File: [phone] Best Call Back Number, if different:Additional Information: called to report blood sugar levels: 02/17- 62372/18- -167 &05401/19- 210 & 44118/20- 192 & 40721/21- 204 & 35035/22- 295 & 29078/23- 213 & 231  Pt would also like a call back to discuss lab results

## 2025-02-27 ENCOUNTER — TELEPHONE (OUTPATIENT)
Dept: FAMILY MEDICINE | Facility: CLINIC | Age: 40
End: 2025-02-27
Payer: MEDICARE

## 2025-02-27 DIAGNOSIS — R74.8 ELEVATED LIVER ENZYMES: Primary | ICD-10-CM

## 2025-02-27 NOTE — TELEPHONE ENCOUNTER
Please contact the patient to present to the lab for another blood draw for MINNIE and Ceruloplasmin as the [employer] weather releated shipping delay resulted in specimen stability being compromised. These are non-fasting.

## 2025-03-03 ENCOUNTER — HOSPITAL ENCOUNTER (OUTPATIENT)
Dept: RADIOLOGY | Facility: HOSPITAL | Age: 40
Discharge: HOME | End: 2025-03-03
Attending: STUDENT IN AN ORGANIZED HEALTH CARE EDUCATION/TRAINING PROGRAM
Payer: MEDICARE

## 2025-03-03 DIAGNOSIS — Z12.31 ENCOUNTER FOR SCREENING MAMMOGRAM FOR MALIGNANT NEOPLASM OF BREAST: ICD-10-CM

## 2025-03-03 DIAGNOSIS — R74.01 TRANSAMINITIS: ICD-10-CM

## 2025-03-03 PROCEDURE — 77067 SCR MAMMO BI INCL CAD: CPT | Mod: 26,,, | Performed by: RADIOLOGY

## 2025-03-03 PROCEDURE — 76705 ECHO EXAM OF ABDOMEN: CPT | Mod: TC

## 2025-03-03 PROCEDURE — 77063 BREAST TOMOSYNTHESIS BI: CPT | Mod: 26,,, | Performed by: RADIOLOGY

## 2025-03-03 PROCEDURE — 77063 BREAST TOMOSYNTHESIS BI: CPT | Mod: TC

## 2025-03-05 ENCOUNTER — TELEPHONE (OUTPATIENT)
Dept: FAMILY MEDICINE | Facility: CLINIC | Age: 40
End: 2025-03-05
Payer: MEDICARE

## 2025-03-05 ENCOUNTER — RESULTS FOLLOW-UP (OUTPATIENT)
Dept: FAMILY MEDICINE | Facility: CLINIC | Age: 40
End: 2025-03-05

## 2025-03-05 DIAGNOSIS — R16.0 HEPATOMEGALY: ICD-10-CM

## 2025-03-05 DIAGNOSIS — K76.0 HEPATIC STEATOSIS: ICD-10-CM

## 2025-03-05 DIAGNOSIS — R74.8 ELEVATED LIVER ENZYMES: Primary | ICD-10-CM

## 2025-03-05 NOTE — TELEPHONE ENCOUNTER
Patient's blood sugars are still very uncontrolled. Please instruct her to increase her insulin regimen by 5U nightly and confirm with her what her new nightly insulin regimen will be.    She will need to continue to monitor her blood sugars and contact the clinic in 1 week with readings and her insulin regimen.

## 2025-03-05 NOTE — TELEPHONE ENCOUNTER
----- Message from Ginna sent at 3/4/2025 11:02 AM CST -----  Regarding: Blood Sugar Levels      Good morning,Ms. Bell call in to report her blood sugar readings as seen below:  Blood sugar levels/ morning/ evening  Feb 24 283 & 298  Feb 25 261 & 189  Feb 26 416 & 226  Feb 27 232 & 287  Feb 28 252 & 300  March 1 243 & 345  March 2 266 & 254  March 3 236 & 258    Thank you have a great day!  Best Regards,  Ginna Soares  PES

## 2025-03-05 NOTE — TELEPHONE ENCOUNTER
Spoke to pt  Pt stated she is currently taking 26 units of Semglee  Advised her to increase to 31 QHS monitor sugars for 1 week and call us with readings  Pt expressed understanding   Thanks

## 2025-03-06 ENCOUNTER — TELEPHONE (OUTPATIENT)
Dept: FAMILY MEDICINE | Facility: CLINIC | Age: 40
End: 2025-03-06
Payer: MEDICARE

## 2025-03-06 NOTE — TELEPHONE ENCOUNTER
Lab orders are in the patients chart. Patient asked if she could get the labs on Monday. Patient advised she could. Carlos

## 2025-03-06 NOTE — TELEPHONE ENCOUNTER
----- Message from Talia sent at 3/6/2025  3:23 PM CST -----  Regarding: lab  .Caller is requesting to schedule their Lab appointment prior to annual appointment.Name of Caller:ptPreferred Date and Time of Labs:Date of EPP Appointment:Where would they like the lab performed?Would the patient rather a call back or a response via [facility]? [facility] Call Back Number: [phone] Information:

## 2025-03-10 ENCOUNTER — LAB VISIT (OUTPATIENT)
Dept: LAB | Facility: HOSPITAL | Age: 40
End: 2025-03-10
Attending: STUDENT IN AN ORGANIZED HEALTH CARE EDUCATION/TRAINING PROGRAM
Payer: MEDICARE

## 2025-03-10 DIAGNOSIS — R74.8 ELEVATED LIVER ENZYMES: ICD-10-CM

## 2025-03-10 PROCEDURE — 86039 ANTINUCLEAR ANTIBODIES (ANA): CPT

## 2025-03-10 PROCEDURE — 36415 COLL VENOUS BLD VENIPUNCTURE: CPT

## 2025-03-10 PROCEDURE — 82390 ASSAY OF CERULOPLASMIN: CPT

## 2025-03-10 PROCEDURE — 86038 ANTINUCLEAR ANTIBODIES: CPT

## 2025-03-11 ENCOUNTER — TELEPHONE (OUTPATIENT)
Dept: FAMILY MEDICINE | Facility: CLINIC | Age: 40
End: 2025-03-11
Payer: MEDICARE

## 2025-03-11 LAB — ANA SER QL HEP2 SUBST: NORMAL

## 2025-03-11 NOTE — TELEPHONE ENCOUNTER
Blood sugars still uncontrolled. Recommend patient increase insulin regimen (Semglee) by 5U. Continue to monitor blood sugars and contact the clinic next week.

## 2025-03-11 NOTE — TELEPHONE ENCOUNTER
----- Message from Rebecca sent at 3/11/2025  9:13 AM CDT -----  .Who Called: Kimi BethsPreferred Method of Contact: Phone CallPatient's Preferred Phone Number on File: [phone] Best Call Back Number, if different:[phone] Information: 03/04 272,339 03/05 281 381  3/6 347 306     3/7   226 199   3/8 245   293 3/9  305 268      3/10 395 245    blood sugar and pt asking for lab results as well

## 2025-03-12 ENCOUNTER — RESULTS FOLLOW-UP (OUTPATIENT)
Dept: FAMILY MEDICINE | Facility: CLINIC | Age: 40
End: 2025-03-12

## 2025-03-12 LAB — CERULOPLASMIN SERPL-MCNC: 33 MG/DL (ref 20–51)

## 2025-03-17 ENCOUNTER — HOSPITAL ENCOUNTER (OUTPATIENT)
Dept: RADIOLOGY | Facility: HOSPITAL | Age: 40
Discharge: HOME | End: 2025-03-17
Attending: STUDENT IN AN ORGANIZED HEALTH CARE EDUCATION/TRAINING PROGRAM
Payer: MEDICARE

## 2025-03-17 DIAGNOSIS — R92.8 ABNORMAL MAMMOGRAM OF LEFT BREAST: ICD-10-CM

## 2025-03-17 PROCEDURE — 76882 US LMTD JT/FCL EVL NVASC XTR: CPT | Mod: TC,LT

## 2025-03-17 PROCEDURE — 76882 US LMTD JT/FCL EVL NVASC XTR: CPT | Mod: 26,LT,, | Performed by: RADIOLOGY

## 2025-03-18 ENCOUNTER — TELEPHONE (OUTPATIENT)
Dept: FAMILY MEDICINE | Facility: CLINIC | Age: 40
End: 2025-03-18
Payer: MEDICARE

## 2025-03-18 NOTE — TELEPHONE ENCOUNTER
Copied from CRM #[id number]. Topic: General Inquiry - Patient Advice  >> Mar 18, 2025  1:14 PM Chary wrote:  Who Called: Kimi Bell    Caller is requesting assistance/information from provider's office.    Symptoms (please be specific):    How long has patient had these symptoms:    List of preferred pharmacies on file (remove unneeded): [unfilled]  If different, enter pharmacy into here including location and phone number:           Patient's Preferred Phone Number on File: [phone]   Best Call Back Number, if different:  Additional Information: called to report blood sugar :   03/11- 289 & 315  03/12 - 269 & 190  03/13 - 214 & 269  03/14 - 232 & 162  03/15  - 165 & 153  03/16 - 150 & 178  03/17 - 177 & 142   Pt also called to discuss  lab results

## 2025-03-18 NOTE — TELEPHONE ENCOUNTER
Please have patient increase her Semglee by another 5U nightly and continue to monitor her blood sugars and contact the clinic next week with blood sugar readings.

## 2025-03-26 ENCOUNTER — TELEPHONE (OUTPATIENT)
Dept: FAMILY MEDICINE | Facility: CLINIC | Age: 40
End: 2025-03-26
Payer: MEDICARE

## 2025-03-26 NOTE — TELEPHONE ENCOUNTER
----- Message from Ayse Grace sent at 3/26/2025 12:19 PM CDT -----  Who Called: Kimi Lynn is requesting assistance/information from provider's office.Symptoms (please be specific): N/A How long has patient had these symptoms:  N/AList of preferred pharmacies on file (remove unneeded): [unfilled]If different, enter pharmacy into here including location and phone number: N/APreferred Method of Contact: Phone CallPatient's Preferred Phone Number on File: [phone] Best Call Back Number, if different:Additional Information: pt reporting blood sugars  3/18  274 and 253   3/19 211 and 194  3/20 269 and 258  3/21 190 and 172  3/22  249 and 251  3/23  226 and 287  3/24 226 and 174  3/25 257 and 202

## 2025-04-01 DIAGNOSIS — E03.8 SUBCLINICAL HYPOTHYROIDISM: ICD-10-CM

## 2025-04-01 DIAGNOSIS — E03.9 HYPOTHYROIDISM, UNSPECIFIED TYPE: ICD-10-CM

## 2025-04-01 DIAGNOSIS — Z79.4 TYPE 2 DIABETES MELLITUS WITH HYPERGLYCEMIA, WITH LONG-TERM CURRENT USE OF INSULIN: ICD-10-CM

## 2025-04-01 DIAGNOSIS — E11.65 TYPE 2 DIABETES MELLITUS WITH HYPERGLYCEMIA, WITH LONG-TERM CURRENT USE OF INSULIN: ICD-10-CM

## 2025-04-01 RX ORDER — SEMAGLUTIDE 1.34 MG/ML
INJECTION, SOLUTION SUBCUTANEOUS
Qty: 3 ML | Refills: 0 | Status: SHIPPED | OUTPATIENT
Start: 2025-04-01

## 2025-04-01 RX ORDER — LEVOTHYROXINE SODIUM 200 UG/1
TABLET ORAL
Qty: 90 TABLET | Refills: 0 | Status: SHIPPED | OUTPATIENT
Start: 2025-04-01

## 2025-04-01 RX ORDER — PEN NEEDLE, DIABETIC 31 GX5/16"
NEEDLE, DISPOSABLE MISCELLANEOUS
Qty: 100 EACH | Refills: 3 | Status: SHIPPED | OUTPATIENT
Start: 2025-04-01

## 2025-04-03 ENCOUNTER — PATIENT OUTREACH (OUTPATIENT)
Facility: CLINIC | Age: 40
End: 2025-04-03
Payer: MEDICARE

## 2025-04-03 NOTE — PROGRESS NOTES
"Health Maintenance Topic(s) Outreach Outcomes & Actions Taken:    Eye Exam - Outreach Outcomes & Actions Taken  : Pt states will call Petey and schedule to complete later    Cervical Cancer Screening - Outreach Outcomes & Actions Taken  : Pt Declined Scheduling Pap Smear/HPV and Pt states "not ready yet." Option also given to complete in clinic when ready. Understanding verbalized.    Cervical Cancer Screening - Outreach Outcomes & Actions Taken  : External Records Requested & Care Team Updated if Applicable and Last Pap Smear 6/7/2021. Dr Cj Aden, III.     Additional Notes:  Annual Wellness Visit: Due     Next PCP F/U: 5/8/2025  Health Maintenance Topics Overdue:  VBHM Score: 3   Cervical Cancer Screening  Eye Exam  Foot Exam       HTN: controlled    DM: Last A1c 11.8. Currently enrolled in DM Education. NV 5/8/2025    Statin Therapy for prevention of CVD: Lipitor. Encouraged med as prescribed. Last fill 11/8/2024.    OPCM referral at last outreach. Closed    Mammogram completed since last outreach. Breasts negative, but radiologist noted a "prominent left axillary lymph node," and recommended a left axillary US of which she has been scheduled.      Care Management, Digital Medicine, and/or Education Referrals      Next Steps - Referral Actions: Digital Medicine Outcomes and Actions Taken: Educated pt on benefits of HTN & DM Dig Medicine. Encouraged patient portal to be able to enroll.            "

## 2025-04-14 ENCOUNTER — DOCUMENTATION ONLY (OUTPATIENT)
Facility: CLINIC | Age: 40
End: 2025-04-14
Payer: MEDICARE

## 2025-04-17 DIAGNOSIS — E03.9 HYPOTHYROIDISM, UNSPECIFIED TYPE: ICD-10-CM

## 2025-04-17 DIAGNOSIS — E03.8 SUBCLINICAL HYPOTHYROIDISM: ICD-10-CM

## 2025-04-17 RX ORDER — LEVOTHYROXINE SODIUM 200 UG/1
TABLET ORAL
Qty: 90 TABLET | Refills: 0 | Status: SHIPPED | OUTPATIENT
Start: 2025-04-17

## 2025-05-06 ENCOUNTER — TELEPHONE (OUTPATIENT)
Dept: FAMILY MEDICINE | Facility: CLINIC | Age: 40
End: 2025-05-06
Payer: MEDICARE

## 2025-05-06 DIAGNOSIS — I10 PRIMARY HYPERTENSION: Primary | ICD-10-CM

## 2025-05-06 DIAGNOSIS — D50.9 IRON DEFICIENCY ANEMIA, UNSPECIFIED IRON DEFICIENCY ANEMIA TYPE: ICD-10-CM

## 2025-05-06 DIAGNOSIS — E11.65 TYPE 2 DIABETES MELLITUS WITH HYPERGLYCEMIA, WITH LONG-TERM CURRENT USE OF INSULIN: ICD-10-CM

## 2025-05-06 DIAGNOSIS — E03.9 HYPOTHYROIDISM, UNSPECIFIED TYPE: ICD-10-CM

## 2025-05-06 DIAGNOSIS — Z79.4 TYPE 2 DIABETES MELLITUS WITH HYPERGLYCEMIA, WITH LONG-TERM CURRENT USE OF INSULIN: ICD-10-CM

## 2025-05-06 DIAGNOSIS — E78.2 MIXED HYPERLIPIDEMIA: ICD-10-CM

## 2025-05-06 NOTE — TELEPHONE ENCOUNTER
----- Message from Barbara sent at 5/6/2025  9:12 AM CDT -----  Regarding: orders  Who Called: Kimi Jon order is the patient requesting: blood work for wellness When does the expect the orders to be performed? Preferred Method of Contact: Phone CallPatient's Preferred Phone Number on File: [phone] Best Call Back Number, if different:Additional Information:

## 2025-05-06 NOTE — TELEPHONE ENCOUNTER
I have ordered the following labs. Please notify the patient.    Orders Placed This Encounter   Procedures    CBC Auto Differential     Standing Status:   Future     Expected Date:   5/6/2025     Expiration Date:   8/6/2025    Comprehensive Metabolic Panel     Standing Status:   Future     Expected Date:   5/6/2025     Expiration Date:   8/6/2025    TSH     Standing Status:   Future     Expected Date:   5/6/2025     Expiration Date:   8/6/2025    T4, Free     Standing Status:   Future     Expected Date:   5/6/2025     Expiration Date:   8/6/2025    Hemoglobin A1C     Standing Status:   Future     Expected Date:   5/6/2025     Expiration Date:   8/6/2025    Lipid Panel     Standing Status:   Future     Expected Date:   5/6/2025     Expiration Date:   8/6/2025

## 2025-05-06 NOTE — TELEPHONE ENCOUNTER
Patient notified via voicemail that lab orders requested are in the system and can be done fasting. Carlos

## 2025-05-07 ENCOUNTER — TELEPHONE (OUTPATIENT)
Dept: FAMILY MEDICINE | Facility: CLINIC | Age: 40
End: 2025-05-07

## 2025-06-11 DIAGNOSIS — E11.65 TYPE 2 DIABETES MELLITUS WITH HYPERGLYCEMIA, WITH LONG-TERM CURRENT USE OF INSULIN: ICD-10-CM

## 2025-06-11 DIAGNOSIS — I10 PRIMARY HYPERTENSION: ICD-10-CM

## 2025-06-11 DIAGNOSIS — Z79.4 TYPE 2 DIABETES MELLITUS WITH HYPERGLYCEMIA, WITH LONG-TERM CURRENT USE OF INSULIN: ICD-10-CM

## 2025-06-12 RX ORDER — GLIMEPIRIDE 4 MG/1
4 TABLET ORAL
Qty: 90 TABLET | Refills: 3 | Status: SHIPPED | OUTPATIENT
Start: 2025-06-12

## 2025-06-12 RX ORDER — LOSARTAN POTASSIUM 50 MG/1
TABLET ORAL
Qty: 90 TABLET | Refills: 3 | Status: SHIPPED | OUTPATIENT
Start: 2025-06-12

## 2025-06-25 ENCOUNTER — OFFICE VISIT (OUTPATIENT)
Dept: NEUROLOGY | Facility: CLINIC | Age: 40
End: 2025-06-25
Payer: MEDICARE

## 2025-06-25 VITALS — BODY MASS INDEX: 57.52 KG/M2 | WEIGHT: 293 LBS | HEIGHT: 60 IN

## 2025-06-25 DIAGNOSIS — G47.33 OBSTRUCTIVE SLEEP APNEA SYNDROME: ICD-10-CM

## 2025-06-25 PROCEDURE — 99999 PR PBB SHADOW E&M-EST. PATIENT-LVL III: CPT | Mod: PBBFAC,,, | Performed by: PSYCHIATRY & NEUROLOGY

## 2025-06-25 PROCEDURE — 99213 OFFICE O/P EST LOW 20 MIN: CPT | Mod: PBBFAC | Performed by: PSYCHIATRY & NEUROLOGY

## 2025-06-25 PROCEDURE — 99203 OFFICE O/P NEW LOW 30 MIN: CPT | Mod: S$PBB,,, | Performed by: PSYCHIATRY & NEUROLOGY

## 2025-06-25 NOTE — PROGRESS NOTES
Subjective     Patient ID: Kimi Bell is a 39 y.o. female.    Chief Complaint: Sleep Apnea (New pt - POLO - referred by SHIRA Kumar )    HPI  Polo dx 20 y ago  On cpap 10 or 11  Nightly use; beneficial  Sleeps well  Needs RF  Uses Viemed  Nasal mask  Other medical issues are stable    Review of Systems  The remainder of the 14 system ROS is noncontributory or negative unless mentioned/reviewed above.       Objective     Physical Exam  Mental Status: Alert and oriented x3. Language is fluent with good comprehension.    Cranial Nerve: Ocular movements are intact. Face is symmetric at rest and with activation with intact sensation throughout. Hearing intact to finger rub bilaterally. Muscles of tongue and palate activate symmetrically. No dysarthria. Strength is full in sternocleidomastoid and trapezius bilaterally.    Motor: Strength is 5/5 in all four extremities both proximally and distally. Intact fine motor movements bilaterally.   Sensory: Sensation is intact to light touch, pinprick, vibration, and proprioception throughout. Romberg is negative.    Mall 4  Neck19       Assessment and Plan     1. Obstructive sleep apnea syndrome  -     Ambulatory referral/consult to Sleep Disorders  -     CPAP/BIPAP SUPPLIES        Refilled cpap supplies         Follow up in about 1 year (around 6/25/2026).

## 2025-07-16 ENCOUNTER — HOSPITAL ENCOUNTER (OUTPATIENT)
Dept: RADIOLOGY | Facility: HOSPITAL | Age: 40
Discharge: HOME | End: 2025-07-16
Attending: STUDENT IN AN ORGANIZED HEALTH CARE EDUCATION/TRAINING PROGRAM
Payer: MEDICARE

## 2025-07-16 DIAGNOSIS — R92.8 ABNORMAL MAMMOGRAM OF LEFT BREAST: ICD-10-CM

## 2025-07-16 PROCEDURE — 76882 US LMTD JT/FCL EVL NVASC XTR: CPT | Mod: TC,RT

## 2025-07-16 PROCEDURE — 76882 US LMTD JT/FCL EVL NVASC XTR: CPT | Mod: 26,LT,, | Performed by: RADIOLOGY

## 2025-07-22 ENCOUNTER — TELEPHONE (OUTPATIENT)
Dept: FAMILY MEDICINE | Facility: CLINIC | Age: 40
End: 2025-07-22
Payer: MEDICARE

## 2025-07-22 NOTE — LETTER
AUTHORIZATION FOR RELEASE OF   CONFIDENTIAL INFORMATION    Dear Dr. Aden,    We are seeing Kimi Bell, date of birth 1985, in the clinic at [facility]. Karmen Kumar DO is the patient's PCP. Kimi Bell has an outstanding lab/procedure at the time we reviewed her chart. In order to help keep her health information updated, she has authorized us to request the following medical record(s):        (  )  MAMMOGRAM                                      (  )  COLONOSCOPY      ( X )  PAP SMEAR ?                                         (  )  OUTSIDE LAB RESULTS     (  )  DEXA SCAN                                          (  )  EYE EXAM            (  )  FOOT EXAM                                          (  )  ENTIRE RECORD     (  )  OUTSIDE IMMUNIZATIONS                 (  )  _______________         Please fax records to Karmen Kumar DO, [phone]     If you have any questions, please contact our office at [phone].           Patient Name: Kimi Bell  : 1985  Patient Phone #: [phone]

## 2025-07-23 ENCOUNTER — TELEPHONE (OUTPATIENT)
Dept: FAMILY MEDICINE | Facility: CLINIC | Age: 40
End: 2025-07-23
Payer: MEDICARE

## 2025-07-23 NOTE — TELEPHONE ENCOUNTER
Copied from CRM #[id number]. Topic: General Inquiry - Return Call  >> Jul 23, 2025  8:26 AM Neeru wrote:  Who Called: Kimikelly Bell    Patient is returning phone call    Who Left Message for Patient:Juanita Moyer MA  Does the patient know what this is regarding?:return call      Preferred Method of Contact: Phone Call  Patient's Preferred Phone Number on File: [phone]   Best Call Back Number, if different:  Additional Information:

## 2025-07-24 NOTE — TELEPHONE ENCOUNTER
Pt notified she is overdue for a papsmear.   Pt stated she will check on that later.  Verbalized understanding

## 2025-08-12 DIAGNOSIS — Z79.4 TYPE 2 DIABETES MELLITUS WITH HYPERGLYCEMIA, WITH LONG-TERM CURRENT USE OF INSULIN: ICD-10-CM

## 2025-08-12 DIAGNOSIS — E11.65 TYPE 2 DIABETES MELLITUS WITH HYPERGLYCEMIA, WITH LONG-TERM CURRENT USE OF INSULIN: ICD-10-CM

## 2025-08-12 DIAGNOSIS — E03.9 HYPOTHYROIDISM, UNSPECIFIED TYPE: ICD-10-CM

## 2025-08-12 DIAGNOSIS — E03.8 SUBCLINICAL HYPOTHYROIDISM: ICD-10-CM

## 2025-08-12 RX ORDER — INSULIN ASPART 100 [IU]/ML
INJECTION, SOLUTION INTRAVENOUS; SUBCUTANEOUS
Qty: 69 ML | Refills: 11 | Status: SHIPPED | OUTPATIENT
Start: 2025-08-12 | End: 2025-08-12 | Stop reason: SDUPTHER

## 2025-08-12 RX ORDER — LEVOTHYROXINE SODIUM 200 UG/1
200 TABLET ORAL
Qty: 90 TABLET | Refills: 0 | Status: SHIPPED | OUTPATIENT
Start: 2025-08-12 | End: 2025-08-12 | Stop reason: SDUPTHER

## 2025-08-12 RX ORDER — LEVOTHYROXINE SODIUM 200 UG/1
200 TABLET ORAL
Qty: 90 TABLET | Refills: 0 | Status: SHIPPED | OUTPATIENT
Start: 2025-08-12

## 2025-08-12 RX ORDER — INSULIN ASPART 100 [IU]/ML
INJECTION, SOLUTION INTRAVENOUS; SUBCUTANEOUS
Qty: 69 ML | Refills: 11 | Status: SHIPPED | OUTPATIENT
Start: 2025-08-12

## 2025-08-12 RX ORDER — SEMAGLUTIDE 1.34 MG/ML
1 INJECTION, SOLUTION SUBCUTANEOUS
Qty: 3 ML | Refills: 0 | Status: SHIPPED | OUTPATIENT
Start: 2025-08-12

## 2025-08-12 RX ORDER — SITAGLIPTIN AND METFORMIN HYDROCHLORIDE 1000; 50 MG/1; MG/1
1 TABLET, FILM COATED ORAL 2 TIMES DAILY WITH MEALS
Qty: 180 TABLET | Refills: 3 | Status: SHIPPED | OUTPATIENT
Start: 2025-08-12

## 2025-08-18 DIAGNOSIS — Z79.4 TYPE 2 DIABETES MELLITUS WITH HYPERGLYCEMIA, WITH LONG-TERM CURRENT USE OF INSULIN: ICD-10-CM

## 2025-08-18 DIAGNOSIS — E11.65 TYPE 2 DIABETES MELLITUS WITH HYPERGLYCEMIA, WITH LONG-TERM CURRENT USE OF INSULIN: ICD-10-CM

## 2025-08-21 ENCOUNTER — TELEPHONE (OUTPATIENT)
Dept: FAMILY MEDICINE | Facility: CLINIC | Age: 40
End: 2025-08-21

## 2025-08-21 DIAGNOSIS — Z79.4 TYPE 2 DIABETES MELLITUS WITH HYPERGLYCEMIA, WITH LONG-TERM CURRENT USE OF INSULIN: Primary | ICD-10-CM

## 2025-08-21 DIAGNOSIS — E11.65 TYPE 2 DIABETES MELLITUS WITH HYPERGLYCEMIA, WITH LONG-TERM CURRENT USE OF INSULIN: Primary | ICD-10-CM
